# Patient Record
Sex: MALE | Race: BLACK OR AFRICAN AMERICAN | NOT HISPANIC OR LATINO | Employment: FULL TIME | URBAN - METROPOLITAN AREA
[De-identification: names, ages, dates, MRNs, and addresses within clinical notes are randomized per-mention and may not be internally consistent; named-entity substitution may affect disease eponyms.]

---

## 2022-04-20 ENCOUNTER — TRANSFERRED RECORDS (OUTPATIENT)
Dept: HEALTH INFORMATION MANAGEMENT | Facility: CLINIC | Age: 42
End: 2022-04-20

## 2022-04-20 LAB
ALBUMIN (URINE) MG/SPEC: <1.2 MG/DL
ALBUMIN/CREATININE RATIO: 10.1 MG/G CREAT
ALT SERPL-CCNC: 30 U/L
AST SERPL-CCNC: 22 U/L
C TRACH DNA SPEC QL PROBE+SIG AMP: NEGATIVE
CHOLESTEROL (EXTERNAL): 169 MG/DL
CREATININE (EXTERNAL): 1.07 MG/DL (ref 0.67–1.31)
CREATININE (URINE): 118.9 MG/DL
GFR ESTIMATED (EXTERNAL): 85 ML/MIN
GFR ESTIMATED (IF AFRICAN AMERICAN) (EXTERNAL): 99 ML/MIN
GLUCOSE (EXTERNAL): 92 MG/DL (ref 70–99)
HDLC SERPL-MCNC: 44 MG/DL
LDL CHOLESTEROL (EXTERNAL): 106 MG/DL
N GONORRHOEA DNA SPEC QL PROBE+SIG AMP: NEGATIVE
NON HDL CHOLESTEROL (EXTERNAL): 125 MG/DL
POTASSIUM (EXTERNAL): 4.1 MMOL/L (ref 3.5–5.5)
SPECIMEN DESCRIP: NORMAL
SPECIMEN DESCRIPTION: NORMAL
TRIGLYCERIDES (EXTERNAL): 96 MG/DL

## 2022-04-28 ENCOUNTER — TRANSFERRED RECORDS (OUTPATIENT)
Dept: HEALTH INFORMATION MANAGEMENT | Facility: CLINIC | Age: 42
End: 2022-04-28

## 2022-12-08 ENCOUNTER — OFFICE VISIT (OUTPATIENT)
Dept: PEDIATRICS | Facility: CLINIC | Age: 42
End: 2022-12-08
Payer: MEDICAID

## 2022-12-08 VITALS
HEART RATE: 82 BPM | WEIGHT: 286.9 LBS | TEMPERATURE: 98 F | RESPIRATION RATE: 16 BRPM | BODY MASS INDEX: 40.17 KG/M2 | HEIGHT: 71 IN | SYSTOLIC BLOOD PRESSURE: 146 MMHG | DIASTOLIC BLOOD PRESSURE: 86 MMHG | OXYGEN SATURATION: 98 %

## 2022-12-08 DIAGNOSIS — Z76.89 POOR DENTITION REQUIRING REFERRAL TO DENTISTRY: ICD-10-CM

## 2022-12-08 DIAGNOSIS — I10 PRIMARY HYPERTENSION: ICD-10-CM

## 2022-12-08 DIAGNOSIS — R74.8 ELEVATED ALKALINE PHOSPHATASE LEVEL: ICD-10-CM

## 2022-12-08 DIAGNOSIS — J45.20 MILD INTERMITTENT ASTHMA, UNSPECIFIED WHETHER COMPLICATED: ICD-10-CM

## 2022-12-08 DIAGNOSIS — F19.90 SUBSTANCE USE DISORDER: ICD-10-CM

## 2022-12-08 DIAGNOSIS — F33.1 MAJOR DEPRESSIVE DISORDER, RECURRENT EPISODE, MODERATE (H): ICD-10-CM

## 2022-12-08 DIAGNOSIS — G40.909 SEIZURE DISORDER (H): Chronic | ICD-10-CM

## 2022-12-08 DIAGNOSIS — E66.01 MORBID OBESITY (H): ICD-10-CM

## 2022-12-08 DIAGNOSIS — E11.9 TYPE 2 DIABETES MELLITUS WITHOUT COMPLICATION, WITHOUT LONG-TERM CURRENT USE OF INSULIN (H): ICD-10-CM

## 2022-12-08 DIAGNOSIS — L40.9 PSORIASIS: ICD-10-CM

## 2022-12-08 DIAGNOSIS — F39 MOOD DISORDER (H): Primary | ICD-10-CM

## 2022-12-08 DIAGNOSIS — F41.9 ANXIETY: ICD-10-CM

## 2022-12-08 DIAGNOSIS — E55.9 VITAMIN D DEFICIENCY: ICD-10-CM

## 2022-12-08 DIAGNOSIS — Z11.3 ROUTINE SCREENING FOR STI (SEXUALLY TRANSMITTED INFECTION): ICD-10-CM

## 2022-12-08 PROBLEM — J45.909 ASTHMA: Chronic | Status: ACTIVE | Noted: 2022-12-08

## 2022-12-08 LAB
ALBUMIN SERPL BCG-MCNC: 4.1 G/DL (ref 3.5–5.2)
ALP SERPL-CCNC: 233 U/L (ref 40–129)
ALT SERPL W P-5'-P-CCNC: 41 U/L (ref 10–50)
ANION GAP SERPL CALCULATED.3IONS-SCNC: 10 MMOL/L (ref 7–15)
AST SERPL W P-5'-P-CCNC: 29 U/L (ref 10–50)
BILIRUB SERPL-MCNC: 0.3 MG/DL
BUN SERPL-MCNC: 11.9 MG/DL (ref 6–20)
CALCIUM SERPL-MCNC: 9.2 MG/DL (ref 8.6–10)
CHLORIDE SERPL-SCNC: 103 MMOL/L (ref 98–107)
CREAT SERPL-MCNC: 1 MG/DL (ref 0.67–1.17)
DEPRECATED HCO3 PLAS-SCNC: 25 MMOL/L (ref 22–29)
ERYTHROCYTE [DISTWIDTH] IN BLOOD BY AUTOMATED COUNT: 12.4 % (ref 10–15)
GFR SERPL CREATININE-BSD FRML MDRD: >90 ML/MIN/1.73M2
GLUCOSE SERPL-MCNC: 224 MG/DL (ref 70–99)
HBA1C MFR BLD: 6.8 % (ref 0–5.6)
HCT VFR BLD AUTO: 46.6 % (ref 40–53)
HCV AB SERPL QL IA: NONREACTIVE
HGB BLD-MCNC: 15.7 G/DL (ref 13.3–17.7)
HIV 1+2 AB+HIV1 P24 AG SERPL QL IA: NONREACTIVE
MCH RBC QN AUTO: 29.7 PG (ref 26.5–33)
MCHC RBC AUTO-ENTMCNC: 33.7 G/DL (ref 31.5–36.5)
MCV RBC AUTO: 88 FL (ref 78–100)
PLATELET # BLD AUTO: 167 10E3/UL (ref 150–450)
POTASSIUM SERPL-SCNC: 4.1 MMOL/L (ref 3.4–5.3)
PROT SERPL-MCNC: 6.6 G/DL (ref 6.4–8.3)
RBC # BLD AUTO: 5.29 10E6/UL (ref 4.4–5.9)
SODIUM SERPL-SCNC: 138 MMOL/L (ref 136–145)
WBC # BLD AUTO: 7.5 10E3/UL (ref 4–11)

## 2022-12-08 PROCEDURE — 82306 VITAMIN D 25 HYDROXY: CPT | Performed by: INTERNAL MEDICINE

## 2022-12-08 PROCEDURE — 82977 ASSAY OF GGT: CPT | Performed by: INTERNAL MEDICINE

## 2022-12-08 PROCEDURE — 85027 COMPLETE CBC AUTOMATED: CPT | Performed by: INTERNAL MEDICINE

## 2022-12-08 PROCEDURE — 80053 COMPREHEN METABOLIC PANEL: CPT | Performed by: INTERNAL MEDICINE

## 2022-12-08 PROCEDURE — 86803 HEPATITIS C AB TEST: CPT | Performed by: INTERNAL MEDICINE

## 2022-12-08 PROCEDURE — 99204 OFFICE O/P NEW MOD 45 MIN: CPT | Performed by: INTERNAL MEDICINE

## 2022-12-08 PROCEDURE — 87591 N.GONORRHOEAE DNA AMP PROB: CPT | Performed by: INTERNAL MEDICINE

## 2022-12-08 PROCEDURE — 87491 CHLMYD TRACH DNA AMP PROBE: CPT | Performed by: INTERNAL MEDICINE

## 2022-12-08 PROCEDURE — 87389 HIV-1 AG W/HIV-1&-2 AB AG IA: CPT | Performed by: INTERNAL MEDICINE

## 2022-12-08 PROCEDURE — 83036 HEMOGLOBIN GLYCOSYLATED A1C: CPT | Performed by: INTERNAL MEDICINE

## 2022-12-08 PROCEDURE — 82043 UR ALBUMIN QUANTITATIVE: CPT | Performed by: INTERNAL MEDICINE

## 2022-12-08 PROCEDURE — 36415 COLL VENOUS BLD VENIPUNCTURE: CPT | Performed by: INTERNAL MEDICINE

## 2022-12-08 PROCEDURE — 86780 TREPONEMA PALLIDUM: CPT | Performed by: INTERNAL MEDICINE

## 2022-12-08 RX ORDER — BETAMETHASONE DIPROPIONATE 0.5 MG/G
CREAM TOPICAL 2 TIMES DAILY
Qty: 45 G | Refills: 3 | Status: SHIPPED | OUTPATIENT
Start: 2022-12-08

## 2022-12-08 RX ORDER — ALBUTEROL SULFATE 90 UG/1
2 AEROSOL, METERED RESPIRATORY (INHALATION) EVERY 6 HOURS PRN
Qty: 18 G | Refills: 3 | Status: SHIPPED | OUTPATIENT
Start: 2022-12-08 | End: 2023-01-09

## 2022-12-08 RX ORDER — EMOLLIENT BASE
CREAM (GRAM) TOPICAL 2 TIMES DAILY
Qty: 453 G | Refills: 3 | Status: SHIPPED | OUTPATIENT
Start: 2022-12-08 | End: 2023-01-09

## 2022-12-08 ASSESSMENT — PATIENT HEALTH QUESTIONNAIRE - PHQ9
10. IF YOU CHECKED OFF ANY PROBLEMS, HOW DIFFICULT HAVE THESE PROBLEMS MADE IT FOR YOU TO DO YOUR WORK, TAKE CARE OF THINGS AT HOME, OR GET ALONG WITH OTHER PEOPLE: VERY DIFFICULT
SUM OF ALL RESPONSES TO PHQ QUESTIONS 1-9: 22
SUM OF ALL RESPONSES TO PHQ QUESTIONS 1-9: 22

## 2022-12-08 ASSESSMENT — PAIN SCALES - GENERAL: PAINLEVEL: WORST PAIN (10)

## 2022-12-08 NOTE — LETTER
December 14, 2022      Lev Downey  [address] [place] [address]        Bk Ohara,    Your vitamin d is quite low. I would recommend high dose treatment x 8 weeks and then 2000 international unit(s) daily (over the counter). Repeat vitamin d in 2 months.      BARBARA Hernandez MD  Internal Medicine-Pediatrics    Vitamin D, Total (25-Hydroxy) 20 - 75 ug/L 10 Low
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
## 2022-12-08 NOTE — PATIENT INSTRUCTIONS
Great to meet you!    We're working on getting more information about what meds you have at [facility], etc.     I do think meeting with Neurology, Dermatology, and Psychiatry will be important. We will help you connect with them.     In the meantime can start the creams for the psoriasis - the beamethasone only goes on your arms/legs.     Priscilla will be in touch after your labs come back.     We'll work together on restarting meds slowly.

## 2022-12-08 NOTE — LETTER
Bk Ohara,     Thank you for choosing [facility] today for your health care needs.     [facility] is transforming primary care  At [facility], we re dedicated to constantly improve how we serve the health care needs of our patients and communities. We re currently making changes to the way we deliver care.      Changes you ll notice include:    An emphasis on building a relationship with a primary care provider    Access to a PAL (personal advocate and liaison) to help guide you with your care needs    Appointment lengths tailored to your specific needs    Greater access to a broader care team and community resources     Improved online access to your care team    Benefits of a primary care provider  If you don t have a designated primary care provider, we encourage you to get to know our care team online and find a provider you d like to see. Most of our providers have a short video on their online provider page. Visit [email or web address] to explore our providers and locations.    Benefits of having a primary care provider include:      They get to know you - your health history, family history and goals, making it easier to make a health plan together.     You get to know them - making health-related conversations and decisions easier      Primary care doctors help you when you re sick or hurt - but also focus on keeping you healthy with preventive care and screenings.      A doctor who sees you regularly is more likely to notice changes in your health.     To help make sure you get the right care, at the right time, we include PALs, or Patient Advocate Liaisons, as part of your care team. Your PAL will be your first line of contact. They ll advocate for your needs and help you navigate our services, connecting you with care team members and community resources to ensure your care is well coordinated. You ll be introduced to a PAL in an upcoming visit.     Expanded care team access with tailored  appointment lengths  Depending on your health care needs, you may have longer or shorter appointments and see additional care team providers - including Medication Therapy Management (MTM) pharmacists, diabetes educators, behavioral health clinicians, or social workers. At times, they may be included in your visit with your provider, or you may see them individually.     Navigating [facility] resources   We partner with [facility] and [facility] to help patients overcome challenges that can make it hard to get health care, including financial hardship, transportation or mobility concerns.     Online access to your health care records and care team  [employer] is our online tool that makes it easy to see your health care information and communicate with your care team.   [employer] allows you to:     View your health maintenance plan so you know when you re due for a preventive screening    Message your PAL or care team     View your health history and visit summaries     Schedule appointments     Complete questionnaires and eCheck-in before appointments      Get care for minor conditions from your provider with an e-visit     View and pay your bill     Sign up at [employer] [facility] [employer]. Once you have an account, you also can download the mobile shagufta.     Connecting to fast and convenient care  When you need fast, convenient care - consider one of the following options:       Video Visit: A convenient care option for visiting with your provider out of the comfort of your own home. Most of the things you come to the clinic to address with your provider can now be done virtually through a video. This includes your chronic medication follow up, questions or concerns you may have, and even your annual Medicare Wellness Visit.       Phone Visit: Another convenient option for follow up of common problems that may require a more in-depth discussion with your provider.       E-visit: When you need  acute care quickly, or have a quick question about your medication, an E-visit is completed through [employer] and your provider will respond within one business day.      Sincerely,  Priscilla ENRIQUEZ [facility]   [phone]

## 2022-12-08 NOTE — LETTER
December 13, 2022      Lev LIEBERMAN Downey  [address] [place] [address]        Bk Ohara,     A1c  looks okay but I know it covers some time w/ and some w/out meds. Restarted glipizide 2.5 mg. Keep track of bg (check fasting a few times a week and 2 hours after meals a few times a week).     Alk phos - often related to liver or bone was a bit elevated. Will repeat in 2 weeks.     Your blood counts were normal.     Your standard STI screening was negative.     Your standard one-time screening for HIV was negative.     Your standard one-time screening for hepatitis C was negative.     We will most likely start blood pressure medications after we are able to reconcile what we receive from [facility].       Resulted Orders   Comprehensive metabolic panel (BMP + Alb, Alk Phos, ALT, AST, Total. Bili, TP)   Result Value Ref Range    Sodium 138 136 - 145 mmol/L    Potassium 4.1 3.4 - 5.3 mmol/L    Chloride 103 98 - 107 mmol/L    Carbon Dioxide (CO2) 25 22 - 29 mmol/L    Anion Gap 10 7 - 15 mmol/L    Urea Nitrogen 11.9 6.0 - 20.0 mg/dL    Creatinine 1.00 0.67 - 1.17 mg/dL    Calcium 9.2 8.6 - 10.0 mg/dL    Glucose 224 (H) 70 - 99 mg/dL    Alkaline Phosphatase 233 (H) 40 - 129 U/L    AST 29 10 - 50 U/L    ALT 41 10 - 50 U/L    Protein Total 6.6 6.4 - 8.3 g/dL    Albumin 4.1 3.5 - 5.2 g/dL    Bilirubin Total 0.3 <=1.2 mg/dL    GFR Estimate >90 >60 mL/min/1.73m2      Comment:      Effective December 21, 2021 eGFRcr in adults is calculated using the 2021 CKD-EPI creatinine equation which includes age and gender (Arron et al., NEJM, DOI: 10.1056/OWLNaf4856456)   Hemoglobin A1c   Result Value Ref Range    Hemoglobin A1C 6.8 (H) 0.0 - 5.6 %      Comment:      Normal <5.7%   Prediabetes 5.7-6.4%    Diabetes 6.5% or higher     Note: Adopted from ADA consensus guidelines.   CBC with platelets   Result Value Ref Range    WBC Count 7.5 4.0 - 11.0 10e3/uL    RBC Count 5.29 4.40 - 5.90 10e6/uL    Hemoglobin 15.7 13.3 - 17.7  g/dL    Hematocrit 46.6 40.0 - 53.0 %    MCV 88 78 - 100 fL    MCH 29.7 26.5 - 33.0 pg    MCHC 33.7 31.5 - 36.5 g/dL    RDW 12.4 10.0 - 15.0 %    Platelet Count 167 150 - 450 10e3/uL   Albumin Random Urine Quantitative with Creat Ratio   Result Value Ref Range    Albumin Urine mg/L <12.0 mg/L      Comment:      The reference ranges have not been established in urine albumin. The results should be integrated into the clinical context for interpretation.    Albumin Urine mg/g Cr        Comment:      Unable to calculate, urine albumin and/or urine creatinine is outside detectable limits.  Microalbuminuria is defined as an albumin:creatinine ratio of 17 to 299 for males and 25 to 299 for females. A ratio of albumin:creatinine of 300 or higher is indicative of overt proteinuria.  Due to biologic variability, positive results should be confirmed by a second, first-morning random or 24-hour timed urine specimen. If there is discrepancy, a third specimen is recommended. When 2 out of 3 results are in the microalbuminuria range, this is evidence for incipient nephropathy and warrants increased efforts at glucose control, blood pressure control, and institution of therapy with an angiotensin-converting-enzyme (ACE) inhibitor (if the patient can tolerate it).      Creatinine Urine mg/dL 161.0 mg/dL      Comment:      The reference ranges have not been established in urine creatinine. The results should be integrated into the clinical context for interpretation.   Hepatitis C Screen Reflex to HCV RNA Quant and Genotype   Result Value Ref Range    Hepatitis C Antibody Nonreactive Nonreactive    Narrative    Assay performance characteristics have not been established for newborns, infants, and children.   HIV Antigen Antibody Combo   Result Value Ref Range    HIV Antigen Antibody Combo Nonreactive Nonreactive      Comment:      HIV-1 p24 Ag & HIV-1/HIV-2 Ab Not Detected   NEISSERIA GONORRHOEA PCR   Result Value Ref Range     Neisseria gonorrhoeae Negative Negative      Comment:      Negative for N. gonorrhoeae rRNA by transcription mediated amplification. A negative result by transcription mediated amplification does not preclude the presence of C. trachomatis infection because results are dependent on proper and adequate collection, absence of inhibitors and sufficient rRNA to be detected.   CHLAMYDIA TRACHOMATIS PCR   Result Value Ref Range    Chlamydia trachomatis Negative Negative      Comment:      A negative result by transcription mediated amplification does not preclude the presence of C. trachomatis infection because results are dependent on proper and adequate collection, absence of inhibitors and sufficient rRNA to be detected.   Treponema Abs w Reflex to RPR and Titer   Result Value Ref Range    Treponema Antibody Total Nonreactive Nonreactive       If you have any questions or concerns, please call [phone] and speak to Priscilla.       Sincerely,      Randall Hernandez MD

## 2022-12-08 NOTE — NURSING NOTE
SB4 PAL saw patient during visit today, introduced role, and gave Welcome Letter.    Resources discussed: PAL support.     Follow up plan discussed with patient. Will follow up with patient as requested by PCP.     Priscilla Downey JENNIFER  [phone]

## 2022-12-09 PROBLEM — F41.9 ANXIETY: Status: ACTIVE | Noted: 2022-12-09

## 2022-12-09 PROBLEM — F39 MOOD DISORDER (H): Status: ACTIVE | Noted: 2022-12-09

## 2022-12-09 PROBLEM — F19.90 SUBSTANCE USE DISORDER: Status: ACTIVE | Noted: 2022-12-09

## 2022-12-09 PROBLEM — F33.1 MAJOR DEPRESSIVE DISORDER, RECURRENT EPISODE, MODERATE (H): Status: ACTIVE | Noted: 2022-12-09

## 2022-12-09 LAB
C TRACH DNA SPEC QL NAA+PROBE: NEGATIVE
CREAT UR-MCNC: 161 MG/DL
GGT SERPL-CCNC: 43 U/L (ref 8–61)
MICROALBUMIN UR-MCNC: <12 MG/L
MICROALBUMIN/CREAT UR: NORMAL MG/G{CREAT}
N GONORRHOEA DNA SPEC QL NAA+PROBE: NEGATIVE
T PALLIDUM AB SER QL: NONREACTIVE

## 2022-12-09 RX ORDER — GLIPIZIDE 2.5 MG/1
2.5 TABLET, EXTENDED RELEASE ORAL DAILY
Qty: 90 TABLET | Refills: 0 | Status: SHIPPED | OUTPATIENT
Start: 2022-12-09 | End: 2023-01-09

## 2022-12-12 ENCOUNTER — TRANSFERRED RECORDS (OUTPATIENT)
Dept: HEALTH INFORMATION MANAGEMENT | Facility: CLINIC | Age: 42
End: 2022-12-12

## 2022-12-12 LAB — DEPRECATED CALCIDIOL+CALCIFEROL SERPL-MC: 10 UG/L (ref 20–75)

## 2022-12-13 ENCOUNTER — TELEPHONE (OUTPATIENT)
Dept: PEDIATRICS | Facility: CLINIC | Age: 42
End: 2022-12-13

## 2022-12-13 RX ORDER — HYDROXYZINE HYDROCHLORIDE 50 MG/1
50 TABLET, FILM COATED ORAL 3 TIMES DAILY PRN
COMMUNITY
End: 2023-01-06

## 2022-12-13 RX ORDER — TRAZODONE HYDROCHLORIDE 100 MG/1
100 TABLET ORAL AT BEDTIME
COMMUNITY
End: 2023-01-06

## 2022-12-13 RX ORDER — GABAPENTIN 300 MG/1
300 CAPSULE ORAL 3 TIMES DAILY
COMMUNITY
End: 2022-12-29

## 2022-12-13 RX ORDER — IBUPROFEN 200 MG
600 TABLET ORAL 3 TIMES DAILY PRN
COMMUNITY

## 2022-12-13 RX ORDER — ACETAMINOPHEN 500 MG
1000 TABLET ORAL EVERY 4 HOURS PRN
COMMUNITY

## 2022-12-13 RX ORDER — LEVETIRACETAM 1000 MG/1
1000 TABLET ORAL 2 TIMES DAILY
COMMUNITY
End: 2023-01-06

## 2022-12-13 RX ORDER — CLONIDINE HYDROCHLORIDE 0.2 MG/1
0.2 TABLET ORAL 3 TIMES DAILY
COMMUNITY
End: 2023-01-06

## 2022-12-13 RX ORDER — BUPRENORPHINE AND NALOXONE 4; 1 MG/1; MG/1
1 FILM, SOLUBLE BUCCAL; SUBLINGUAL 2 TIMES DAILY
COMMUNITY
End: 2023-03-08

## 2022-12-13 RX ORDER — CITALOPRAM HYDROBROMIDE 40 MG/1
40 TABLET ORAL EVERY MORNING
COMMUNITY
End: 2023-01-06

## 2022-12-13 RX ORDER — DOCUSATE SODIUM 100 MG/1
100 CAPSULE, LIQUID FILLED ORAL 2 TIMES DAILY
COMMUNITY
End: 2023-01-06

## 2022-12-13 RX ORDER — DIPHENHYDRAMINE HCL 25 MG
25 TABLET ORAL
COMMUNITY
End: 2022-12-14

## 2022-12-13 RX ORDER — GINGER ROOT 550 MG
500 CAPSULE ORAL EVERY 4 HOURS PRN
COMMUNITY

## 2022-12-13 RX ORDER — LANOLIN ALCOHOL/MO/W.PET/CERES
3 CREAM (GRAM) TOPICAL
COMMUNITY
End: 2022-12-14

## 2022-12-13 RX ORDER — BUPROPION HYDROCHLORIDE 150 MG/1
150 TABLET ORAL EVERY MORNING
COMMUNITY
End: 2023-01-06

## 2022-12-13 RX ORDER — ASPIRIN 325 MG/1
650 TABLET, FILM COATED ORAL EVERY 4 HOURS PRN
COMMUNITY

## 2022-12-13 NOTE — TELEPHONE ENCOUNTER
Received medication list from Claudette at [facility].     Claudette stated that they dispense all medications to patients.     Reconciled medications.     They do not have the emollient cream listed on their medication list.    Priscilla Downey, JENNIFER  [phone]

## 2022-12-14 DIAGNOSIS — G47.00 INSOMNIA, UNSPECIFIED TYPE: Primary | ICD-10-CM

## 2022-12-14 DIAGNOSIS — F19.90 SUBSTANCE USE DISORDER: ICD-10-CM

## 2022-12-14 DIAGNOSIS — F33.1 MAJOR DEPRESSIVE DISORDER, RECURRENT EPISODE, MODERATE (H): ICD-10-CM

## 2022-12-14 DIAGNOSIS — F39 MOOD DISORDER (H): ICD-10-CM

## 2022-12-14 RX ORDER — DIPHENHYDRAMINE HCL 25 MG
50 TABLET ORAL
Start: 2022-12-14

## 2022-12-14 RX ORDER — LANOLIN ALCOHOL/MO/W.PET/CERES
6 CREAM (GRAM) TOPICAL
Start: 2022-12-14

## 2022-12-14 RX ORDER — ERGOCALCIFEROL 1.25 MG/1
50000 CAPSULE, LIQUID FILLED ORAL WEEKLY
Qty: 8 CAPSULE | Refills: 0 | Status: SHIPPED | OUTPATIENT
Start: 2022-12-14 | End: 2023-01-09

## 2022-12-14 NOTE — CONFIDENTIAL NOTE
Please do blood pressure 3x weekly and call weekly w/ results.     Mid-morning okay - at least 2 hours after AM meds.     Sitting calmly for 10 min, legs uncrossed, arm at heart level. Take 3x and average.     Letter signed.    BARBARA Hernandez MD  Internal Medicine-Pediatrics

## 2022-12-14 NOTE — LETTER
December 14, 2022      Lev Downey  [address] [place] [address]        To whom it may concern,   RE: Lev Ohara is currently a patient under my care. It is my recommendation that he be scheduled with long term therapy and psychiatry. Most are virtual so he will be able to continue care after he is discharged if he decides to move back to [place]. They are also booking out into the new year so it would be prudent to start the process now.     To schedule please call: [phone]       Sincerely,      Randall Hernandez MD

## 2022-12-14 NOTE — CONFIDENTIAL NOTE
Do they check his bp there? Was elevated here in clinic and would consider starting another antihypertensive. Could add more labs and do bp recheck on 12/22/22 if we start something.    28 days? If so please call closer to discharge date to see plan.    Holding off on prescribing meds until we see if he will be staying locally. Can see Beth Villatoro for suboxone if staying locally (put something on calendar now).     In the future -   - Neuro  - MTM (? Clonidine as best med)    Psych referral and therapy referral placed now.     BARBARA Hernandez MD  Internal Medicine-Pediatrics

## 2022-12-14 NOTE — PROGRESS NOTES
Called [facility] and spoke to Claudette.     Stated they need a doctors order to do blood pressures. Will need how often through out the week and the time of day if a specified time is required.     Claudette is unsure of his discharge date. It is usually close to 28 days but depends on the patient. His discharge should be around the end of this month.     Would like to work on long term psych and therapy with counselor. Requested information for scheduling to be faxed and it will be given to his counselor to review with the patient. Pended it in letter format if PCP agrees.     Priscilla Downey JENNIFER  [phone]

## 2022-12-14 NOTE — LETTER
December 14, 2022      Lev Downey  [address] [place] [address]              To nursing staff,  RE: Lev Downey      Please do blood pressure 3x weekly and call weekly w/ results.      Mid-morning okay - at least 2 hours after AM meds.      Sitting calmly for 10 min, legs uncrossed, arm at heart level. Take 3x and average.       Sincerely,      Randall Hernandez MD

## 2022-12-22 ENCOUNTER — LAB (OUTPATIENT)
Dept: LAB | Facility: CLINIC | Age: 42
End: 2022-12-22
Payer: MEDICAID

## 2022-12-22 DIAGNOSIS — R74.8 ELEVATED ALKALINE PHOSPHATASE LEVEL: ICD-10-CM

## 2022-12-22 DIAGNOSIS — L40.9 PSORIASIS: ICD-10-CM

## 2022-12-22 DIAGNOSIS — F41.9 ANXIETY: Primary | ICD-10-CM

## 2022-12-22 LAB
ALBUMIN SERPL BCG-MCNC: 3.7 G/DL (ref 3.5–5.2)
ALP SERPL-CCNC: 201 U/L (ref 40–129)
ALT SERPL W P-5'-P-CCNC: 23 U/L (ref 10–50)
AST SERPL W P-5'-P-CCNC: 20 U/L (ref 10–50)
BILIRUB DIRECT SERPL-MCNC: <0.2 MG/DL (ref 0–0.3)
BILIRUB SERPL-MCNC: 0.2 MG/DL
PROT SERPL-MCNC: 5.8 G/DL (ref 6.4–8.3)
PTH-INTACT SERPL-MCNC: 43 PG/ML (ref 15–65)

## 2022-12-22 PROCEDURE — 36415 COLL VENOUS BLD VENIPUNCTURE: CPT

## 2022-12-22 PROCEDURE — 83970 ASSAY OF PARATHORMONE: CPT

## 2022-12-22 PROCEDURE — 80076 HEPATIC FUNCTION PANEL: CPT

## 2022-12-22 NOTE — TELEPHONE ENCOUNTER
Medication Question or Refill    Contacts       Type Contact Phone/Fax    12/22/2022 12:37 PM CST Phone (Incoming) Lev Downey LUISANA (Self) [phone] (H)     Phone # is for [facility] staff; [phone]          What medication are you calling about (include dose and sig)?:     1) gabapentin (NEURONTIN) 300 MG capsule  Pt asking for rx for 1200mg, 3x/day    2) Ottella(?sp?) for psoriasis    Controlled Substance Agreement on file:   CSA -- Patient Level:    CSA: None found at the patient level.       Who prescribed the medication?: asking Dr Holly Nicholas to refill    Do you need a refill? Yes    When did you use the medication last? current    Patient offered an appointment? No    Do you have any questions or concerns?  Yes - Dr Holly Nicholas had discussed at 12/8/2022 visit; pt will build up gabapentin rx to 1200 mg 3/xday.  Other rx is for psoriasis; not sure if disclosed to Dr Fernandez.    Preferred Pharmacy:     YES - [facility] [phone]  [address]  [address]  [facility] [address]  Phone: [phone] Fax: [phone]      Okay to leave a detailed message?: No at Other phone number:  Call [facility] at [phone] with questions; staff will answer.

## 2022-12-26 NOTE — TELEPHONE ENCOUNTER
Routing refill request to provider for review/approval because:  Drug not on the [facility] refill protocol   Psoriasis Rx's - recently sent to pharm at beginning of month, no refills needed.     Otezla vs Otella? Last visit recommended reconnecting with Derm but no referral placed

## 2022-12-26 NOTE — TELEPHONE ENCOUNTER
Called Renee from patient's treatment facility:      Currently prescribed 300 MG 3x a day- Darryl is prescribing it.     Reaching out to you to see if PCP would be willing to up it, not controlling symptoms.    Discharge date still not decided.     Renee requested a call back because she is not sure what the plan fr the patient it and Claudette will be back tomorrow. Postponing encounter for additional outreach.     Priscilla Downey JENNIFER  [phone]

## 2022-12-29 RX ORDER — GABAPENTIN 300 MG/1
300 CAPSULE ORAL 3 TIMES DAILY
Qty: 90 CAPSULE | Refills: 1 | Status: SHIPPED | OUTPATIENT
Start: 2022-12-29 | End: 2023-01-09

## 2022-12-29 NOTE — TELEPHONE ENCOUNTER
Refilled the gabapentin. Are there other meds that I need to take over?    Derm referral placed. I would also have them try [facility] to see if he can be seen sooner. If he can be flexible on location may get him seen sooner.    BARBARA Hernandez MD  Internal Medicine-Pediatrics

## 2022-12-30 NOTE — TELEPHONE ENCOUNTER
Called nursing team and scheduled appointment. They will call me if patient leaves treatment facility before appointment date. Discharge date has still not been decided.     Priscilla Downey, JENNIFER  [phone]

## 2023-01-06 DIAGNOSIS — F41.9 ANXIETY: ICD-10-CM

## 2023-01-06 DIAGNOSIS — E11.9 TYPE 2 DIABETES MELLITUS WITHOUT COMPLICATION, WITHOUT LONG-TERM CURRENT USE OF INSULIN (H): ICD-10-CM

## 2023-01-06 DIAGNOSIS — L40.9 PSORIASIS: ICD-10-CM

## 2023-01-06 DIAGNOSIS — R56.9 PARTIAL SEIZURES (H): Primary | ICD-10-CM

## 2023-01-06 DIAGNOSIS — E55.9 VITAMIN D DEFICIENCY: ICD-10-CM

## 2023-01-06 DIAGNOSIS — J45.20 MILD INTERMITTENT ASTHMA, UNSPECIFIED WHETHER COMPLICATED: ICD-10-CM

## 2023-01-06 DIAGNOSIS — K59.00 CONSTIPATION, UNSPECIFIED CONSTIPATION TYPE: ICD-10-CM

## 2023-01-06 DIAGNOSIS — G47.00 INSOMNIA, UNSPECIFIED TYPE: ICD-10-CM

## 2023-01-06 NOTE — TELEPHONE ENCOUNTER
Patient called and left a voicemail on my direct line. Patient stated he needs a refill of all of his medications. He also mentioned he will be leaving [facility], did not specify a date. Gave new pharmacy, pended.     Attempted to call patient back to get more information on his timeline, no answer. Unable to leave a message voicemail is not set-up.     Priscilla Downey, CHG  [phone]

## 2023-01-09 RX ORDER — CLONIDINE HYDROCHLORIDE 0.2 MG/1
0.2 TABLET ORAL 3 TIMES DAILY
Qty: 270 TABLET | Refills: 0 | Status: SHIPPED | OUTPATIENT
Start: 2023-01-09 | End: 2023-04-24

## 2023-01-09 RX ORDER — GLIPIZIDE 2.5 MG/1
2.5 TABLET, EXTENDED RELEASE ORAL DAILY
Qty: 90 TABLET | Refills: 0 | Status: SHIPPED | OUTPATIENT
Start: 2023-01-09 | End: 2023-04-24

## 2023-01-09 RX ORDER — LEVETIRACETAM 1000 MG/1
1000 TABLET ORAL 2 TIMES DAILY
Qty: 180 TABLET | Refills: 0 | Status: SHIPPED | OUTPATIENT
Start: 2023-01-09

## 2023-01-09 RX ORDER — EMOLLIENT BASE
CREAM (GRAM) TOPICAL 2 TIMES DAILY
Qty: 453 G | Refills: 3 | Status: SHIPPED | OUTPATIENT
Start: 2023-01-09

## 2023-01-09 RX ORDER — CITALOPRAM HYDROBROMIDE 40 MG/1
40 TABLET ORAL EVERY MORNING
Qty: 90 TABLET | Refills: 0 | Status: SHIPPED | OUTPATIENT
Start: 2023-01-09 | End: 2023-04-24

## 2023-01-09 RX ORDER — DOCUSATE SODIUM 100 MG/1
100 CAPSULE, LIQUID FILLED ORAL 2 TIMES DAILY
Qty: 180 CAPSULE | Refills: 0 | Status: SHIPPED | OUTPATIENT
Start: 2023-01-09

## 2023-01-09 RX ORDER — GABAPENTIN 300 MG/1
300 CAPSULE ORAL 3 TIMES DAILY
Qty: 270 CAPSULE | Refills: 0 | Status: SHIPPED | OUTPATIENT
Start: 2023-01-09 | End: 2023-03-08

## 2023-01-09 RX ORDER — BUPROPION HYDROCHLORIDE 150 MG/1
150 TABLET ORAL EVERY MORNING
Qty: 90 TABLET | Refills: 0 | Status: SHIPPED | OUTPATIENT
Start: 2023-01-09

## 2023-01-09 RX ORDER — ERGOCALCIFEROL 1.25 MG/1
50000 CAPSULE, LIQUID FILLED ORAL WEEKLY
Qty: 8 CAPSULE | Refills: 0 | Status: SHIPPED | OUTPATIENT
Start: 2023-01-09 | End: 2023-03-22

## 2023-01-09 RX ORDER — HYDROXYZINE HYDROCHLORIDE 50 MG/1
50 TABLET, FILM COATED ORAL 3 TIMES DAILY PRN
Qty: 270 TABLET | Refills: 0 | Status: SHIPPED | OUTPATIENT
Start: 2023-01-09

## 2023-01-09 RX ORDER — TRAZODONE HYDROCHLORIDE 100 MG/1
100 TABLET ORAL AT BEDTIME
Qty: 90 TABLET | Refills: 0 | Status: SHIPPED | OUTPATIENT
Start: 2023-01-09 | End: 2023-04-24

## 2023-01-09 RX ORDER — ALBUTEROL SULFATE 90 UG/1
2 AEROSOL, METERED RESPIRATORY (INHALATION) EVERY 6 HOURS PRN
Qty: 18 G | Refills: 3 | Status: SHIPPED | OUTPATIENT
Start: 2023-01-09

## 2023-01-10 NOTE — TELEPHONE ENCOUNTER
Refilled for #90 days so he can establish with someone in [place].    Will need Derm, Neuro.     Typically would not prescribe wellbutrin in someone w/ history of seizure disorder but will not make a change at this time.     Will need to be bridged with suboxone from another provider.     BARBARA Hernandez MD  Internal Medicine-Pediatrics

## 2023-01-16 ENCOUNTER — TRANSFERRED RECORDS (OUTPATIENT)
Dept: HEALTH INFORMATION MANAGEMENT | Facility: CLINIC | Age: 43
End: 2023-01-16

## 2023-01-17 NOTE — TELEPHONE ENCOUNTER
Called patient, informed of refills. Patient understands that he only has 90 days.     Patient will establish with a new provider in [place] and look for neuro/derm.     Patient's insurance will cover rides within 30 miles of home.     Patient confirmed appointment tomorrow for a phone visit.     Priscilla Downey, JENNIFER  [phone]

## 2023-01-19 ENCOUNTER — TELEPHONE (OUTPATIENT)
Dept: PEDIATRICS | Facility: CLINIC | Age: 43
End: 2023-01-19

## 2023-01-19 ENCOUNTER — VIRTUAL VISIT (OUTPATIENT)
Dept: PEDIATRICS | Facility: CLINIC | Age: 43
End: 2023-01-19
Payer: COMMERCIAL

## 2023-01-19 DIAGNOSIS — F33.1 MAJOR DEPRESSIVE DISORDER, RECURRENT EPISODE, MODERATE (H): ICD-10-CM

## 2023-01-19 DIAGNOSIS — E11.9 TYPE 2 DIABETES MELLITUS WITHOUT COMPLICATION, WITHOUT LONG-TERM CURRENT USE OF INSULIN (H): Chronic | ICD-10-CM

## 2023-01-19 DIAGNOSIS — F19.90 SUBSTANCE USE DISORDER: Primary | ICD-10-CM

## 2023-01-19 DIAGNOSIS — F39 MOOD DISORDER (H): ICD-10-CM

## 2023-01-19 DIAGNOSIS — R56.9 PARTIAL SEIZURES (H): ICD-10-CM

## 2023-01-19 DIAGNOSIS — F41.9 ANXIETY: ICD-10-CM

## 2023-01-19 DIAGNOSIS — E66.01 MORBID OBESITY (H): ICD-10-CM

## 2023-01-19 DIAGNOSIS — F19.90 SUBSTANCE USE DISORDER: ICD-10-CM

## 2023-01-19 DIAGNOSIS — I10 PRIMARY HYPERTENSION: ICD-10-CM

## 2023-01-19 DIAGNOSIS — L40.9 PSORIASIS: Chronic | ICD-10-CM

## 2023-01-19 PROCEDURE — 99214 OFFICE O/P EST MOD 30 MIN: CPT | Mod: 95 | Performed by: INTERNAL MEDICINE

## 2023-01-19 RX ORDER — BUPRENORPHINE AND NALOXONE 4; 1 MG/1; MG/1
1 FILM, SOLUBLE BUCCAL; SUBLINGUAL DAILY
Qty: 4 FILM | Refills: 0 | Status: SHIPPED | OUTPATIENT
Start: 2023-01-19 | End: 2023-01-20

## 2023-01-19 RX ORDER — BUPRENORPHINE AND NALOXONE 4; 1 MG/1; MG/1
1 FILM, SOLUBLE BUCCAL; SUBLINGUAL DAILY
Qty: 4 FILM | Refills: 0 | Status: SHIPPED | OUTPATIENT
Start: 2023-01-19 | End: 2023-01-19

## 2023-01-19 NOTE — PROGRESS NOTES
"Lev is a 42 year old who is being evaluated via a billable telephone visit.        Distant Location (provider location):  On-site    Assessment & Plan       ICD-10-CM    1. Substance use disorder on suboxone  F19.90       2. Mood disorder (H)  F39       3. Major depressive disorder, recurrent episode, moderate (H)  F33.1       4. BMI > 40.0 (H)  E66.01       5. Anxiety  F41.9       6. Partial seizures (H)  R56.9       7. Type 2 diabetes mellitus without complication, without long-term current use of insulin (H)  E11.9       8. Psoriasis  L40.9       9. Primary hypertension  I10         Meds recently sent.     Dann w/ colleague and she will bridge him through today/tomorrow and then do VV tomorrow to talked about bridging.    Dann w/ PAL and she will ensure [facility] in [place] has filled his meds.     Gave pt # for [facility] and PAL.      30 minutes spent on the date of the encounter doing chart review, history and exam, documentation and further activities per the note       BMI:   Estimated body mass index is 40.58 kg/m  as calculated from the following:    Height as of 12/8/22: 1.791 m (5' 10.5\").    Weight as of 12/8/22: 130.1 kg (286 lb 14.4 oz).           No follow-ups on file.    Randall Hernandez MD  [facility] DEANDRA Ohara is a 42 year old, presenting for the following health issues:  Recheck Medication      HPI     Left [facility] last Monday.    Moved up to [place] - has family and previously lived in the area.     Only gave him 3 day of suboxone - tried to stretch it out - took it every other day.   - [facility] had him at two 4 tabs twice a daily - before he got incarcerated he was at 8 mg tabs twice a day.     [facility] can do suboxone he thinks. Also re-enrolled in aftercare at [facility]. They do mental health and thinks they have medical management. Will have more information tomorrow.    Didn't realize that I sent 3 months of his other medications " to [facility] in [place] to bridge him.     Review of Systems   Constitutional, HEENT, cardiovascular, pulmonary, gi and gu systems are negative, except as otherwise noted.      Objective           Vitals:  No vitals were obtained today due to virtual visit.    Physical Exam   healthy, alert and no distress  PSYCH: Alert and oriented times 3; coherent speech, normal   rate and volume, able to articulate logical thoughts, able   to abstract reason, no tangential thoughts, no hallucinations   or delusions  His affect is normal  RESP: No cough, no audible wheezing, able to talk in full sentences  Remainder of exam unable to be completed due to telephone visits            Phone call duration: 14 minutes

## 2023-01-19 NOTE — TELEPHONE ENCOUNTER
PRIOR AUTHORIZATION DENIED    Medication: buprenorphine HCl-naloxone HCl (SUBOXONE) 4-1 MG per film - EPA DENIED    Denial Date: 1/19/2023    Denial Rational:           Appeal Information:

## 2023-01-20 ENCOUNTER — VIRTUAL VISIT (OUTPATIENT)
Dept: PEDIATRICS | Facility: CLINIC | Age: 43
End: 2023-01-20
Payer: COMMERCIAL

## 2023-01-20 DIAGNOSIS — F11.21 OPIOID USE DISORDER, MODERATE, IN EARLY REMISSION (H): Primary | ICD-10-CM

## 2023-01-20 DIAGNOSIS — F19.90 SUBSTANCE USE DISORDER: ICD-10-CM

## 2023-01-20 DIAGNOSIS — F33.1 MAJOR DEPRESSIVE DISORDER, RECURRENT EPISODE, MODERATE (H): ICD-10-CM

## 2023-01-20 PROCEDURE — 99214 OFFICE O/P EST MOD 30 MIN: CPT | Mod: 95 | Performed by: NURSE PRACTITIONER

## 2023-01-20 RX ORDER — BUPRENORPHINE AND NALOXONE 4; 1 MG/1; MG/1
1 FILM, SOLUBLE BUCCAL; SUBLINGUAL 2 TIMES DAILY
Qty: 60 FILM | Refills: 0 | Status: SHIPPED | OUTPATIENT
Start: 2023-01-20 | End: 2023-02-08

## 2023-01-20 NOTE — Clinical Note
"Harrison:   -Just met with [place] and he seems to be a poor historian re: medication and his OUD  -He is doing IOP at [facility] in [place].  His therapist is [place].   -He drops urine x2 weekly.  We need [place] to fax those UDS results to us.  Can you contact [place] and give her our station's fax number? (Pt will need to sign release there) -Do you mind giving her your contact info if she needs to get in touch with us? -I will continue prescribing his Suboxone until he finds a provider -Can you f/u with him in 2 w to see how he is doing--withdrawals etc? -I will do a VV with him in 4 weeks.  Use the Suboxone block that I have built in my schedule.  30m is fine.  Jesus: -Has questions about current gabapentin dose and medication for his psoriasis.  I'm guessing his neuropathy/pain will get better once he is stable on his Sub.  He is on a very low dose of Sub so there is wiggle room to increase.  We could also increase his sree to his \"usual\" dose of 1200mg daily.  Let me know your thoughts."

## 2023-01-20 NOTE — PROGRESS NOTES
Lev is a 42 year old who is being evaluated via a billable telephone visit.      What phone number would you like to be contacted at? [phone]  How would you like to obtain your AVS? [place]art    Distant Location (provider location):  On-site    Assessment & Plan     Opioid use disorder, moderate, in early remission (H)  Substance use disorder  Currently in early remission.  IOP at [facility] in [place], [place].    Therapist [place].  Messaged PAL to have patient sign release to have UDS faxed to us.  PAL to follow-up in 2 weeks for check-in regarding withdrawal symptoms and current dose.  Follow-up in 4 weeks Suboxone refill.  - buprenorphine HCl-naloxone HCl (SUBOXONE) 4-1 MG per film; Place 1 Film under the tongue 2 times daily    Major depressive disorder, recurrent episode, moderate (H)  Stable.  Followed by PCP         Return in about 4 weeks (around 2/17/2023) for Follow up.    Beth Villatoro NP  [facility] DEANDRA Blevins   Lev is a 42 year old presenting for the following health issues:    Medication Request      HPI       OUD:  -Started with alcohol use and marijuana socially at a young age  -Was abusing alcohol and marijuana until he had kids then stopped.  -Methamphetamine use intermittently between [age] years of age.  -Started back with alcohol and marijuana along with oxycodone and fentanyl to control pain.    -Treatment at [facility] in [place] starting November 28th.  Was scheduled for 30 day tx but relapsed with   -Relapsed in treatment with oxycodone--states someone had it in the center  -Out of treatment 1/5/2023  -Suboxone 4mg BID #30 prescribed 12/24/2022.  Has been stretching out prescription.   -Relapsed 1/10/2023: oxycodone, meth, fentanyl  -Recently started IOP at [facility] x5 week.  [place], [place].  Therapist in [place].  Provides UDS x2 weekly  -Lives with family who is supportive.  -Current withdrawal symptoms--nausea, weakness, cravings.  -Incarcerated on and  off since 2005 and that has made it difficult with medication compliance  -Looking into a medical marijuana card      Review of Systems   Constitutional, HEENT, cardiovascular, pulmonary, gi and gu systems are negative, except as otherwise noted.      Objective       Vitals:  No vitals were obtained today due to virtual visit.    Physical Exam   healthy, alert and no distress  PSYCH: Alert and oriented times 3; coherent speech, normal   rate and volume, able to articulate logical thoughts, able   to abstract reason, no tangential thoughts, no hallucinations   or delusions  His affect is normal  RESP: No cough, no audible wheezing, able to talk in full sentences  Remainder of exam unable to be completed due to telephone visits          Phone call duration: 33 minutes

## 2023-01-20 NOTE — TELEPHONE ENCOUNTER
Patient is able to get prescription with name brand. Has not picked them up yet.     Priscilla Downey, [facility]  [phone]

## 2023-01-20 NOTE — PATIENT INSTRUCTIONS
It was nice seeing you today.    Please let me know if you have any questions regarding today's visit!    Take care,    WANDA Villatoro DNP  Family Medicine

## 2023-01-23 ENCOUNTER — TELEPHONE (OUTPATIENT)
Dept: PEDIATRICS | Facility: CLINIC | Age: 43
End: 2023-01-23
Payer: COMMERCIAL

## 2023-01-23 ENCOUNTER — TRANSFERRED RECORDS (OUTPATIENT)
Dept: HEALTH INFORMATION MANAGEMENT | Facility: CLINIC | Age: 43
End: 2023-01-23

## 2023-01-23 NOTE — TELEPHONE ENCOUNTER
Huddled with Beth Villatoro.     Now that pt is living in [place] needs to have PCP, MAT/OUD provider, psychiatry, dermatology, and neurology.     Encourage him to sign ERWIN for current therapist so we can help coordinate.    In terms of meds, etc likely to keep things the way they are and encourage him to find a local provider ASAMARY ANNE.     BARBARA Hernandez MD  Internal Medicine-Pediatrics

## 2023-01-30 NOTE — TELEPHONE ENCOUNTER
Called patient's cell, it went straight to a busy tone.     Called patient's mother, no answer. [facility] requesting a call back directly to [facility] extension.     Priscilla Downey, [facility]  [phone]

## 2023-01-31 NOTE — TELEPHONE ENCOUNTER
Called patient to discuss new providers. No answer and patient does not have a voicemail set-up.     Priscilla Downey, CHG  [phone]

## 2023-02-05 ENCOUNTER — TRANSFERRED RECORDS (OUTPATIENT)
Dept: HEALTH INFORMATION MANAGEMENT | Facility: CLINIC | Age: 43
End: 2023-02-05

## 2023-02-06 ENCOUNTER — TRANSFERRED RECORDS (OUTPATIENT)
Dept: HEALTH INFORMATION MANAGEMENT | Facility: CLINIC | Age: 43
End: 2023-02-06

## 2023-02-07 NOTE — TELEPHONE ENCOUNTER
Called patient, no answer, unable to leave a voicemail, not set up.     Priscilla Downey, [facility]  [phone]

## 2023-02-08 ENCOUNTER — VIRTUAL VISIT (OUTPATIENT)
Dept: PEDIATRICS | Facility: CLINIC | Age: 43
End: 2023-02-08
Payer: COMMERCIAL

## 2023-02-08 DIAGNOSIS — F19.90 SUBSTANCE USE DISORDER: ICD-10-CM

## 2023-02-08 DIAGNOSIS — U07.1 INFECTION DUE TO 2019 NOVEL CORONAVIRUS: ICD-10-CM

## 2023-02-08 DIAGNOSIS — F11.21 OPIOID USE DISORDER, MODERATE, IN EARLY REMISSION (H): Primary | ICD-10-CM

## 2023-02-08 PROCEDURE — 99213 OFFICE O/P EST LOW 20 MIN: CPT | Mod: CS | Performed by: NURSE PRACTITIONER

## 2023-02-08 RX ORDER — BUPRENORPHINE AND NALOXONE 4; 1 MG/1; MG/1
1 FILM, SOLUBLE BUCCAL; SUBLINGUAL 2 TIMES DAILY
Qty: 60 FILM | Refills: 0 | Status: SHIPPED | OUTPATIENT
Start: 2023-02-08 | End: 2023-03-08

## 2023-02-08 NOTE — PROGRESS NOTES
Lev is a 42 year old who is being evaluated via a billable telephone visit.      What phone number would you like to be contacted at? [phone]  How would you like to obtain your AVS? Cornelius    Distant Location (provider location):  On-site    Assessment & Plan     Opioid use disorder, moderate, in early remission (H)  Substance use disorder  Stable.  Continue with Suboxone 4mg BID.  Continue working program through [facility] in [place], [place].  Follow-up in one month or sooner if needed.  - buprenorphine HCl-naloxone HCl (SUBOXONE) 4-1 MG per film  Dispense: 60 Film; Refill: 0    Infection due to 2019 novel coronavirus  Tested positive 7-10 days ago.  Symptoms improving.       Counseled the patient on the importance of having a recovery program in addition to medication to manage recovery. Components include avoiding isolating, having willingness to change, avoiding triggers and managing cravings. Encouraged having some type of sober network and practicing honesty with trusted support person(s). Encouraged other services such as counseling, 12 step or other self-help organizations.       Opioid warning reviewed.  Risk of overdose following a period of abstinence due to decrease tolerance was discussed including risk of death.  Strongly recommended abstain from alcohol, benzodiazepines, THC, opioids and other drugs of abuse.  Increased risk of return to opioid use after use of these substances discussed.  Increased risk of overdose/death with use of other substances particularly benzodiazepines/alcohol reviewed      Return in about 4 weeks (around 3/8/2023) for Follow up: Suboxone.    Beth Villatoro NP  [facility] DEANDRA Ohara is a 42 year old presenting for the following health issues:    No chief complaint on file.    HPI     Patient here for follow-up on OUD.    Suboxone dose: currently taking Suboxone 4mg BID.  Last visit was 1/20/2023  Cravings or withdrawals? None.  He  is feeling achy but unsure if this is from withdrawal symptoms or COVID  Last use? 1/10/2023  Currently doing IOP at [facility] in [place], [place], x5 weekly from 6-9pm.  Housing:  Living at home with family.    COVID infection. Dx one week ago.  Weakness.  Body aches.  Symptoms improving.  Has not been attending meetings since being diagnosed with COVID    Last UDS submitted at [facility] 1/26/2023 was negative.    Review of Systems   Constitutional, HEENT, cardiovascular, pulmonary, gi and gu systems are negative, except as otherwise noted.      Objective           Vitals:  No vitals were obtained today due to virtual visit.    Physical Exam   healthy, alert and no distress  PSYCH: Alert and oriented times 3; coherent speech, normal   rate and volume, able to articulate logical thoughts, able   to abstract reason, no tangential thoughts, no hallucinations   or delusions  His affect is normal  RESP: No cough, no audible wheezing, able to talk in full sentences  Remainder of exam unable to be completed due to telephone visits            Phone call duration: 6 minutes

## 2023-02-17 ENCOUNTER — NURSE TRIAGE (OUTPATIENT)
Dept: PEDIATRICS | Facility: CLINIC | Age: 43
End: 2023-02-17
Payer: COMMERCIAL

## 2023-02-17 NOTE — TELEPHONE ENCOUNTER
"Pt called in reporting he had covid 2 weeks ago. Tested positive 2/3/23 then negative on 2/6/23. Symptoms started 1/31 had loss of taste and sense of smell but came back within a week. Pt reports doing better but has chest pain 10/10 located about 6 inches to the right of his heart by his sternum. Unable to take deep breath without pain. Pain came on about 20 minutes ago after her was biking, pt reports had to stop biking and pain slowly went away. Hard to breathe, has inhalor for asthma and just used it not long ago with mild relief.    Huddled with POD and recommends ER for further evaluation.    Patient was given an opportunity to ask questions, verbalized understanding of plan, and is agreeable.    Michelle Riley RN on 2/17/2023 at 3:59 PM          Reason for Disposition    SEVERE chest pain    Answer Assessment - Initial Assessment Questions  1. LOCATION: \"Where does it hurt?\"        6 inches to the right of chest, mid chest. Slight tightness, unable to do deep breath  2. RADIATION: \"Does the pain go anywhere else?\" (e.g., into neck, jaw, arms, back)      No radiation  3. ONSET: \"When did the chest pain begin?\" (Minutes, hours or days)       Chronic chest pain but got significantly worse today  4. PATTERN \"Does the pain come and go, or has it been constant since it started?\"  \"Does it get worse with exertion?\"       Pain is constant and worse with exertion  5. DURATION: \"How long does it last\" (e.g., seconds, minutes, hours)      n/a  6. SEVERITY: \"How bad is the pain?\"  (e.g., Scale 1-10; mild, moderate, or severe)     - MILD (1-3): doesn't interfere with normal activities      - MODERATE (4-7): interferes with normal activities or awakens from sleep     - SEVERE (8-10): excruciating pain, unable to do any normal activities        10  7. CARDIAC RISK FACTORS: \"Do you have any history of heart problems or risk factors for heart disease?\" (e.g., angina, prior heart attack; diabetes, high blood pressure, " "high cholesterol, smoker, or strong family history of heart disease)      Smoker, high blood pressure  8. PULMONARY RISK FACTORS: \"Do you have any history of lung disease?\"  (e.g., blood clots in lung, asthma, emphysema, birth control pills)      asthma  9. CAUSE: \"What do you think is causing the chest pain?\"      no  10. OTHER SYMPTOMS: \"Do you have any other symptoms?\" (e.g., dizziness, nausea, vomiting, sweating, fever, difficulty breathing, cough)        Vomiting, sweat, nausea, difficulty breathing, cough  11. PREGNANCY: \"Is there any chance you are pregnant?\" \"When was your last menstrual period?\"        no    Protocols used: CHEST PAIN-A-OH      "

## 2023-03-06 ENCOUNTER — TELEPHONE (OUTPATIENT)
Dept: PEDIATRICS | Facility: CLINIC | Age: 43
End: 2023-03-06
Payer: COMMERCIAL

## 2023-03-06 NOTE — TELEPHONE ENCOUNTER
Called patient to confirm upcoming appointment. Patient stated he was going back to [facility] and requested an in-person appointment.     Patient has been having intermittent chest congestion. Changed appointment to in-person with longer time if needed.     Priscilla Downey, [facility]  [phone]

## 2023-03-08 ENCOUNTER — OFFICE VISIT (OUTPATIENT)
Dept: PEDIATRICS | Facility: CLINIC | Age: 43
End: 2023-03-08
Payer: COMMERCIAL

## 2023-03-08 VITALS
WEIGHT: 295.3 LBS | RESPIRATION RATE: 14 BRPM | HEIGHT: 71 IN | BODY MASS INDEX: 41.34 KG/M2 | OXYGEN SATURATION: 98 % | HEART RATE: 72 BPM | TEMPERATURE: 98 F | DIASTOLIC BLOOD PRESSURE: 66 MMHG | SYSTOLIC BLOOD PRESSURE: 130 MMHG

## 2023-03-08 DIAGNOSIS — L40.9 PSORIASIS: ICD-10-CM

## 2023-03-08 DIAGNOSIS — G89.4 CHRONIC PAIN SYNDROME: ICD-10-CM

## 2023-03-08 DIAGNOSIS — G62.9 NEUROPATHY: ICD-10-CM

## 2023-03-08 DIAGNOSIS — F11.21 OPIOID USE DISORDER, MODERATE, IN EARLY REMISSION (H): Primary | ICD-10-CM

## 2023-03-08 DIAGNOSIS — B35.3 TINEA PEDIS OF BOTH FEET: ICD-10-CM

## 2023-03-08 DIAGNOSIS — Z86.16 PERSONAL HISTORY OF COVID-19: ICD-10-CM

## 2023-03-08 LAB
AMPHETAMINES UR QL: DETECTED
BARBITURATES UR QL SCN: NOT DETECTED
BENZODIAZ UR QL SCN: NOT DETECTED
BUPRENORPHINE UR QL: DETECTED
CANNABINOIDS UR QL: DETECTED
COCAINE UR QL SCN: NOT DETECTED
D-METHAMPHET UR QL: DETECTED
METHADONE UR QL SCN: NOT DETECTED
OPIATES UR QL SCN: NOT DETECTED
OXYCODONE UR QL SCN: NOT DETECTED
PCP UR QL SCN: NOT DETECTED
PROPOXYPH UR QL: NOT DETECTED
TRICYCLICS UR QL SCN: NOT DETECTED

## 2023-03-08 PROCEDURE — 99214 OFFICE O/P EST MOD 30 MIN: CPT | Performed by: NURSE PRACTITIONER

## 2023-03-08 PROCEDURE — 80306 DRUG TEST PRSMV INSTRMNT: CPT | Performed by: NURSE PRACTITIONER

## 2023-03-08 RX ORDER — GABAPENTIN 600 MG/1
600 TABLET ORAL 3 TIMES DAILY
Qty: 90 TABLET | Refills: 0 | Status: SHIPPED | OUTPATIENT
Start: 2023-03-08 | End: 2023-03-22

## 2023-03-08 RX ORDER — CLOTRIMAZOLE AND BETAMETHASONE DIPROPIONATE 10; .64 MG/G; MG/G
CREAM TOPICAL 2 TIMES DAILY
Qty: 45 G | Refills: 1 | Status: SHIPPED | OUTPATIENT
Start: 2023-03-08

## 2023-03-08 RX ORDER — BUPRENORPHINE HYDROCHLORIDE AND NALOXONE HYDROCHLORIDE DIHYDRATE 8; 2 MG/1; MG/1
TABLET SUBLINGUAL
Qty: 45 TABLET | Refills: 0 | Status: SHIPPED | OUTPATIENT
Start: 2023-03-08 | End: 2023-03-22 | Stop reason: DRUGHIGH

## 2023-03-08 ASSESSMENT — ASTHMA QUESTIONNAIRES
ACT_TOTALSCORE: 17
QUESTION_1 LAST FOUR WEEKS HOW MUCH OF THE TIME DID YOUR ASTHMA KEEP YOU FROM GETTING AS MUCH DONE AT WORK, SCHOOL OR AT HOME: A LITTLE OF THE TIME
QUESTION_5 LAST FOUR WEEKS HOW WOULD YOU RATE YOUR ASTHMA CONTROL: SOMEWHAT CONTROLLED
QUESTION_4 LAST FOUR WEEKS HOW OFTEN HAVE YOU USED YOUR RESCUE INHALER OR NEBULIZER MEDICATION (SUCH AS ALBUTEROL): ONE OR TWO TIMES PER DAY
ACT_TOTALSCORE: 17
QUESTION_2 LAST FOUR WEEKS HOW OFTEN HAVE YOU HAD SHORTNESS OF BREATH: ONCE OR TWICE A WEEK
QUESTION_3 LAST FOUR WEEKS HOW OFTEN DID YOUR ASTHMA SYMPTOMS (WHEEZING, COUGHING, SHORTNESS OF BREATH, CHEST TIGHTNESS OR PAIN) WAKE YOU UP AT NIGHT OR EARLIER THAN USUAL IN THE MORNING: ONCE OR TWICE

## 2023-03-08 ASSESSMENT — PATIENT HEALTH QUESTIONNAIRE - PHQ9
SUM OF ALL RESPONSES TO PHQ QUESTIONS 1-9: 15
SUM OF ALL RESPONSES TO PHQ QUESTIONS 1-9: 15
10. IF YOU CHECKED OFF ANY PROBLEMS, HOW DIFFICULT HAVE THESE PROBLEMS MADE IT FOR YOU TO DO YOUR WORK, TAKE CARE OF THINGS AT HOME, OR GET ALONG WITH OTHER PEOPLE: VERY DIFFICULT

## 2023-03-08 ASSESSMENT — PAIN SCALES - GENERAL: PAINLEVEL: MODERATE PAIN (5)

## 2023-03-08 NOTE — PROGRESS NOTES
Assessment & Plan     Opioid use disorder, moderate, in early remission (H)  Relapsed 3/5/2023.  Experiencing cravings.   Increased Suboxone to 8mg in the morning and 4mg in the evening.  Follow-up in 2 weeks for VV and 4 weeks for OV.  - Drug Abuse Screen Panel 13, Urine (Pain Care Package) - lab collect  - buprenorphine-naloxone (SUBOXONE) 8-2 MG SUBL sublingual tablet; Take 8mg in the morning and 4mg at night.    Tinea pedis of both feet  Worsening.  Referral given along with cream.  - Orthopedic  Referral; Future  - clotrimazole-betamethasone (LOTRISONE) 1-0.05 % external cream; Apply topically 2 times daily    Psoriasis  Referral to dermatology placed.  - Adult Dermatology Referral; Future    Neuropathy  Increased gabapentin to 600mg TID.  Follow-up in 2 weeks.    Personal history of COVID-19  ERWIN signed from ER in [place].    Chronic pain syndrome  Increased Suboxone today  Increased gabapentin today  Follow-up in 2 weeks.      Return in about 4 weeks (around 4/5/2023) for with me.   2 weeks for VV  4 weeks for OV.    Beth Villatoro NP  [facility] DEANDRA Ohara is a 42 year old, presenting for the following health issues:    Recheck Medication      HPI     Suboxone:  -Currently taking Suboxone 4mg BID.  Interested in increasing dose to help with cravings.  -Last visit was 2/8/2023  -Per patient, he refilled a prescription of Suboxone although no refill was given last visit and states a box is missing.  Unsure where it went.  Unsure if the box was the prescription given in February.  He has been taking medication daily.    -Cravings or withdrawals? Yes.    -Last use? 3/5/2023.  Methamphetamine.   -Currently doing inpatient treatment at [facility] 3/6/2023.  [facility] in [place] at the end of February.    -Provides transportation for appointments.  -Does not refill medication.      Chronic pain:  -Hand pain, foot pain, and  "low back pain.  -Interested in increasing gabapentin dose.  States this dose was lowered while he was incarcerated.    -Gabapentin helps with DM neuropathy and back pain.    History of COVID   -Was seen in ER due to chest congestion.  This was in [place].  -Per patient, everything was normal and was told to follow-up with PCP.    Needs refill of fungal cream for feet.    Otzela. Needs refill from dermatologist.    Review of Systems   Constitutional, HEENT, cardiovascular, pulmonary, gi and gu systems are negative, except as otherwise noted.      Objective    /66 (BP Location: Right arm, Patient Position: Sitting, Cuff Size: Adult Large)   Pulse 72   Temp 98  F (36.7  C) (Tympanic)   Resp 14   Ht 1.791 m (5' 10.5\")   Wt 133.9 kg (295 lb 4.8 oz)   SpO2 98%   BMI 41.77 kg/m    Body mass index is 41.77 kg/m .       Physical Exam   GENERAL: healthy, alert and no distress  RESP: lungs clear to auscultation - no rales, rhonchi or wheezes  CV: regular rate and rhythm, normal S1 S2, no S3 or S4, no murmur, click or rub, no peripheral edema  PSYCH: mentation appears normal, affect normal/bright  Foot exam: tinea pedis, dry cracking heels and nail exam onychomycosis of the toenails                "

## 2023-03-14 DIAGNOSIS — E55.9 VITAMIN D DEFICIENCY: ICD-10-CM

## 2023-03-14 RX ORDER — ERGOCALCIFEROL 1.25 MG/1
50000 CAPSULE, LIQUID FILLED ORAL WEEKLY
Qty: 8 CAPSULE | Refills: 0 | OUTPATIENT
Start: 2023-03-14

## 2023-03-14 NOTE — TELEPHONE ENCOUNTER
Needs vitamin D rechecked. Should start 2000 international unit(s) now. Please make a note in next in person shagufta to have vit d lab checked (will be with JK).     BARBARA Hernandez MD  Internal Medicine-Pediatrics

## 2023-03-16 ENCOUNTER — NURSE TRIAGE (OUTPATIENT)
Dept: NURSING | Facility: CLINIC | Age: 43
End: 2023-03-16
Payer: COMMERCIAL

## 2023-03-16 NOTE — TELEPHONE ENCOUNTER
Called [facility], no answer, unable to leave a message.     Priscilla Downey, CHG  [phone]

## 2023-03-17 ENCOUNTER — TELEPHONE (OUTPATIENT)
Dept: PEDIATRICS | Facility: CLINIC | Age: 43
End: 2023-03-17

## 2023-03-17 RX ORDER — BUPRENORPHINE AND NALOXONE 8; 2 MG/1; MG/1
1 FILM, SOLUBLE BUCCAL; SUBLINGUAL DAILY
Status: CANCELLED | OUTPATIENT
Start: 2023-03-17

## 2023-03-17 NOTE — TELEPHONE ENCOUNTER
Received call from patient asking if tongue pain, red strip and white film on tongue are side effects of suboxone.  Advised tongue pain can be a side effect. Patient needed to get off the phone as he was getting grief from other roommates waiting to use it.     WM Luque on 3/17/2023 at 2:48 PM

## 2023-03-17 NOTE — TELEPHONE ENCOUNTER
"Patient called from [facility] concerning prescription for suboxone prescription. Would like to go back to the film form as the sublingual form \"stings\" his tongue and c/o getting red strip on top of tongue.    Does take as directed.    Would also like time change for dosing as current times interfere with group therapy.     Would like to take 8 mg in afternoon instead of morning and take in the 4 mg evening instead of bedtime.    The staff administers the meds, so would need order faxed to [phone].    Call ended abruptly, as may have been disconnected. Attempted to call patient per mobile number and left message if wanted to give further information.    Routed to pcp and prescribing provider.    Starla Schneider RN    "

## 2023-03-19 NOTE — TELEPHONE ENCOUNTER
Thank you for the message.  We have a follow-up on Wednesday this week and we can address the current concern regarding the films versus tablets.    Harrison--Can you reach out to Lev regarding his dose?  He is currently taking 8mg in the morning and 4mg in the afternoon.  Per the Triage RN notes he wants to take 8mg in the afternoon and 4mg in the evening.  This would mean no morning dose.  Let's call to clarify.    Thanks,  Carmela

## 2023-03-20 DIAGNOSIS — E55.9 VITAMIN D DEFICIENCY: ICD-10-CM

## 2023-03-20 NOTE — TELEPHONE ENCOUNTER
Called and discussed with, Vijaya, nurse manager at [facility]. She stated they currently give suboxone at around (8mg) 7 AM and (4mg) 9 PM. Can change dose to noon and nine PM or whatever patient prefers.    Requested a podiatry referral for patient's foot issue. Gave number to schedule with ortho as referral was already placed.     Vijaya also stated that patient has been concerned with some of his medications and does not believe they are accurate from before he went to [facility]. Would like that to be addressed if possible at upcoming virtual appointment.     Informed of vit D labs, will relay to patient.     Discharge: not scheduled.     Priscilla Downey, [facility]  [phone]

## 2023-03-20 NOTE — TELEPHONE ENCOUNTER
Agree w/ acute visit today if he can make it.     Thank you,   BARBARA Hernandez MD  Internal Medicine-Pediatrics    
Attempted to reach patient again, LVMTCB. Per chart review, patient has scheduled appointment 3/17/23. Upon call back, need to see if rescheduling for today preferred/still an option.     Jerome MUÑOZ RN 3/16/2023 at 11:40 AM    
Attempted to reach patient, LVMTCB. At this time, there are acute visits available at  today. Per chart review, patient has scheduled appointment 3/17/23. Upon call back, need to see if rescheduling for today preferred/still an option.     Jerome MUÑOZ RN 3/16/2023 at 9:02 AM    
Per chart review, patient has upcoming appointment 3/22/23. Closing encounter.     Jerome MUÑOZ RN 3/20/2023 at 10:16 AM    
Triage call  Sinus congestion blowing green mucus from his nose.  [facility]  He was asking for a nasal spray he said the over the counter does not help him the nurse line for the facility is [phone].   He also has a sore throat and wanted to be tested for strep throat. Recommended that he goes to  Urgent care if no appointments available and he has to wait till tomorrow to get a ride     Per protocol see in office today or tomorrow.Care advice given.  Verbalizes understanding and agrees with plan.  Transferred to scheduling.  Routing to provider.    Maria Elena Wood RN   [facility] Nurse Advisor  7:10 AM 3/16/2023        Reason for Disposition    Sinus congestion (pressure, fullness) present > 10 days    Additional Information    Negative: Difficulty breathing, and not from stuffy nose (e.g., not relieved by cleaning out the nose)    Negative: Sounds like a life-threatening emergency to the triager    Negative: SEVERE headache and has fever    Negative: Patient sounds very sick or weak to the triager    Negative: SEVERE sinus pain    Negative: Severe headache    Negative: Redness or swelling on the cheek, forehead, or around the eye    Negative: Fever > 103 F (39.4 C)    Negative: Fever > 101 F (38.3 C) and over 60 years of age    Negative: Fever > 100.0 F (37.8 C) and has diabetes mellitus or a weak immune system (e.g., HIV positive, cancer chemotherapy, organ transplant, splenectomy, chronic steroids)    Negative: Fever > 100.0 F (37.8 C) and bedridden (e.g., nursing home patient, stroke, chronic illness, recovering from surgery)    Negative: Fever present > 3 days (72 hours)    Negative: Fever returns after gone for over 24 hours and symptoms worse or not improved    Negative: Sinus pain (not just congestion) and fever    Negative: Earache    Protocols used: SINUS PAIN OR CONGESTION-A-OH      
D'Amico, Randy

## 2023-03-20 NOTE — TELEPHONE ENCOUNTER
Called and discussed with [facility], they will relay to patient.     Priscilla Downey, [facility]  [phone]

## 2023-03-21 ENCOUNTER — OFFICE VISIT (OUTPATIENT)
Dept: PODIATRY | Facility: CLINIC | Age: 43
End: 2023-03-21
Attending: NURSE PRACTITIONER
Payer: COMMERCIAL

## 2023-03-21 DIAGNOSIS — M79.672 PAIN IN BOTH FEET: Primary | ICD-10-CM

## 2023-03-21 DIAGNOSIS — B35.3 TINEA PEDIS OF BOTH FEET: ICD-10-CM

## 2023-03-21 DIAGNOSIS — M79.671 PAIN IN BOTH FEET: Primary | ICD-10-CM

## 2023-03-21 PROCEDURE — 99203 OFFICE O/P NEW LOW 30 MIN: CPT | Performed by: PODIATRIST

## 2023-03-21 NOTE — PATIENT INSTRUCTIONS
PATIENT INSTRUCTIONS - Podiatry / Foot & Ankle Surgery    Dermatology referral today      Here is a list of routine foot care resources, which includes toenail trimming and callus/corn management.     This is not a referral. It is your responsibility to contact the organization and your insurance to confirm cost and coverage.      ROUTINE FOOT CARE (NAIL TRIMMING / CALLUSES)      [facility]  [phone]   [facility]  [phone]  Multiple locations   [facility]  [phone] Dr. Carrillo and Dr. Gage  [address]  [place] [address]  [phone]             [facility]UST[facility] LOCATIONS  [facility]  [address]  [place] [address]  Phone: [phone]  Fax: [phone] [facility]  [address]  [place] [address]  Phone: [phone]   Fax: [phone]   [facility]  [address]  [place] [address]  Phone: [phone]  Fax: [phone] [facility]  [address]  [place] [address]  Phone: [phone]   Fax: [phone]   [facility]   [address]  [place] [address]  Phone: [phone]  Fax: [phone] * Please call any location listed to make an appointment for a casting/fitting. Your referral was sent to their central office and they will all have the order on file.

## 2023-03-21 NOTE — PROGRESS NOTES
Assessment:      ICD-10-CM    1. Pain in both feet  M79.671 Orthotics and Prosthetics DME Other (Comments)    M79.672       2. Tinea pedis of both feet  B35.3 Adult Dermatology Referral     Orthopedic  Referral             Plan:  Orders Placed This Encounter   Procedures     Adult Dermatology Referral     Orthotics and Prosthetics DME Other (Comments)       Discussed the etiology and treatment of the condition with the patient.  Discussed treatment options.      PCP or dermatology for fungus treatment    Dermatology for definitive biopsy & treatment.    Foot care nurse for nail & callus trimming        Orthotic Rx    For foot or ankle pain - return for standing x-rays        Margarita Germain DPM                Chief Complaint:     Patient presents with:  Musculoskeletal Problem: Tinea pedis of both feet     Fungus    HPI:  Lev Downey is a 42 year old year old male who presents for evaluation of fungus.        Past Medical & Surgical History:  Past Medical History:   Diagnosis Date     Asthma     No Comments Provided     Convulsions (H)     No Comments Provided     Essential hypertension     No Comments Provided     Obesity     No Comments Provided     Other depressive disorder     No Comments Provided      Past Surgical History:   Procedure Laterality Date     OTHER SURGICAL HISTORY      1997,.0,([place]) SURGICAL PROCEDURE,left hand- 8-25-97-multi surg      Family History   Problem Relation Age of Onset     Alcoholism Father         Alcoholism     Allergy (Severe) Brother         Allergies     Allergy (Severe) Mother         Allergies     Arthritis Mother         Arthritis     Asthma Brother         Asthma     Prostate Cancer Father         Cancer-prostate     Diabetes Father         Diabetes     Hypertension Mother         Hypertension     Hyperlipidemia Mother         Hyperlipidemia     Other - See Comments Father         Psychiatric illness     Seizure Disorder Brother         Seizures     Other  - See Comments Mother         Stroke        Social History:  ?  History   Smoking Status     Every Day     Packs/day: 0.50     Types: Cigarettes   Smokeless Tobacco     Current     History   Drug use: Unknown     Types: Other     Comment: Drug use: No     Social History    Substance and Sexual Activity      Alcohol use: Yes        Alcohol/week: 0.0 standard drinks        Comment: Alcoholic Drinks/day: Occasional      Allergies:  ?   Allergies   Allergen Reactions     Amitriptyline Swelling     Carbamazepine Angioedema     Prozac [Fluoxetine]         Medications:    Current Outpatient Medications   Medication     acetaminophen (TYLENOL) 500 MG tablet     albuterol (PROAIR HFA/PROVENTIL HFA/VENTOLIN HFA) 108 (90 Base) MCG/ACT inhaler     apremilast (OTEZLA) 30 MG tablet     aspirin - buffered (ASCRIPTIN) 325 MG TABS tablet     aspirin-acetaminophen-caffeine (EXCEDRIN MIGRAINE) 250-250-65 MG tablet     betamethasone dipropionate (DIPROSONE) 0.05 % external cream     bismuth subsalicylate (PEPTO BISMOL) 262 MG/15ML suspension     buprenorphine-naloxone (SUBOXONE) 8-2 MG SUBL sublingual tablet     buPROPion (WELLBUTRIN XL) 150 MG 24 hr tablet     citalopram (CELEXA) 40 MG tablet     cloNIDine (CATAPRES) 0.2 MG tablet     clotrimazole-betamethasone (LOTRISONE) 1-0.05 % external cream     diphenhydrAMINE (BENADRYL) 25 MG tablet     docusate sodium (COLACE) 100 MG capsule     emollient (VANICREAM) external cream     gabapentin (NEURONTIN) 600 MG tablet     Marisol, Zingiber officinalis, (MARISOL ROOT) 550 MG CAPS capsule     glipiZIDE (GLUCOTROL XL) 2.5 MG 24 hr tablet     hydrOXYzine (ATARAX) 50 MG tablet     ibuprofen (ADVIL/MOTRIN) 200 MG tablet     levETIRAcetam (KEPPRA) 1000 MG tablet     melatonin 3 MG tablet     traZODone (DESYREL) 100 MG tablet     vitamin D2 (ERGOCALCIFEROL) 30022 units (1250 mcg) capsule     No current facility-administered medications for this visit.       Physical Exam:  ?  Vitals:  There were no  vitals taken for this visit.   General:  WD/WN, in NAD.  A&O x3.  Dermatologic:    Skin is intact, open lesions absent.  Fungus present  bilateral.  Vascular:  Pulses not palpable bilateral.  Digital capillary refill time normal bilateral.  Generalized edema- 2+ bilateral.  Neurologic:    Gross sensation normal.  Gait and balance normal.  Musculoskeletal:  No pain to palpation of foot & ankle.  aROM intact to foot & ankle.  Muscle strength 5/5 foot & ankle.    RCSP  Valgus R varus L

## 2023-03-21 NOTE — LETTER
3/21/2023         RE: Lev Downey  [address]  [place] [address]        Dear Colleague,    Thank you for referring your patient, Lev Downey, to the [facility]. Please see a copy of my visit note below.    Assessment:      ICD-10-CM    1. Pain in both feet  M79.671 Orthotics and Prosthetics DME Other (Comments)    M79.672       2. Tinea pedis of both feet  B35.3 Adult Dermatology Referral     Orthopedic  Referral             Plan:  Orders Placed This Encounter   Procedures     Adult Dermatology Referral     Orthotics and Prosthetics DME Other (Comments)       Discussed the etiology and treatment of the condition with the patient.  Discussed treatment options.      PCP or dermatology for fungus treatment    Dermatology for definitive biopsy & treatment.    Foot care nurse for nail & callus trimming        Orthotic Rx    For foot or ankle pain - return for standing x-rays        Margarita Germain DPM                Chief Complaint:     Patient presents with:  Musculoskeletal Problem: Tinea pedis of both feet     Fungus    HPI:  Lev Downey is a 42 year old year old male who presents for evaluation of fungus.        Past Medical & Surgical History:  Past Medical History:   Diagnosis Date     Asthma     No Comments Provided     Convulsions (H)     No Comments Provided     Essential hypertension     No Comments Provided     Obesity     No Comments Provided     Other depressive disorder     No Comments Provided      Past Surgical History:   Procedure Laterality Date     OTHER SURGICAL HISTORY      1997,.0,([place]) SURGICAL PROCEDURE,left hand- 2-08-54-multi surg      Family History   Problem Relation Age of Onset     Alcoholism Father         Alcoholism     Allergy (Severe) Brother         Allergies     Allergy (Severe) Mother         Allergies     Arthritis Mother         Arthritis     Asthma Brother         Asthma     Prostate Cancer Father          Cancer-prostate     Diabetes Father         Diabetes     Hypertension Mother         Hypertension     Hyperlipidemia Mother         Hyperlipidemia     Other - See Comments Father         Psychiatric illness     Seizure Disorder Brother         Seizures     Other - See Comments Mother         Stroke        Social History:  ?  History   Smoking Status     Every Day     Packs/day: 0.50     Types: Cigarettes   Smokeless Tobacco     Current     History   Drug use: Unknown     Types: Other     Comment: Drug use: No     Social History    Substance and Sexual Activity      Alcohol use: Yes        Alcohol/week: 0.0 standard drinks        Comment: Alcoholic Drinks/day: Occasional      Allergies:  ?   Allergies   Allergen Reactions     Amitriptyline Swelling     Carbamazepine Angioedema     Prozac [Fluoxetine]         Medications:    Current Outpatient Medications   Medication     acetaminophen (TYLENOL) 500 MG tablet     albuterol (PROAIR HFA/PROVENTIL HFA/VENTOLIN HFA) 108 (90 Base) MCG/ACT inhaler     apremilast (OTEZLA) 30 MG tablet     aspirin - buffered (ASCRIPTIN) 325 MG TABS tablet     aspirin-acetaminophen-caffeine (EXCEDRIN MIGRAINE) 250-250-65 MG tablet     betamethasone dipropionate (DIPROSONE) 0.05 % external cream     bismuth subsalicylate (PEPTO BISMOL) 262 MG/15ML suspension     buprenorphine-naloxone (SUBOXONE) 8-2 MG SUBL sublingual tablet     buPROPion (WELLBUTRIN XL) 150 MG 24 hr tablet     citalopram (CELEXA) 40 MG tablet     cloNIDine (CATAPRES) 0.2 MG tablet     clotrimazole-betamethasone (LOTRISONE) 1-0.05 % external cream     diphenhydrAMINE (BENADRYL) 25 MG tablet     docusate sodium (COLACE) 100 MG capsule     emollient (VANICREAM) external cream     gabapentin (NEURONTIN) 600 MG tablet     Marisol, Zingiber officinalis, (MARISOL ROOT) 550 MG CAPS capsule     glipiZIDE (GLUCOTROL XL) 2.5 MG 24 hr tablet     hydrOXYzine (ATARAX) 50 MG tablet     ibuprofen (ADVIL/MOTRIN) 200 MG tablet     levETIRAcetam  (KEPPRA) 1000 MG tablet     melatonin 3 MG tablet     traZODone (DESYREL) 100 MG tablet     vitamin D2 (ERGOCALCIFEROL) 44987 units (1250 mcg) capsule     No current facility-administered medications for this visit.       Physical Exam:  ?  Vitals:  There were no vitals taken for this visit.   General:  WD/WN, in NAD.  A&O x3.  Dermatologic:    Skin is intact, open lesions absent.  Fungus present  bilateral.  Vascular:  Pulses not palpable bilateral.  Digital capillary refill time normal bilateral.  Generalized edema- 2+ bilateral.  Neurologic:    Gross sensation normal.  Gait and balance normal.  Musculoskeletal:  No pain to palpation of foot & ankle.  aROM intact to foot & ankle.  Muscle strength 5/5 foot & ankle.    RCSP  Valgus R varus L                  Again, thank you for allowing me to participate in the care of your patient.        Sincerely,        Margarita Germain DPM

## 2023-03-21 NOTE — TELEPHONE ENCOUNTER
Routing refill request to provider for review/approval because:  Pt saw new provider at [facility]  See last refill note for high dose vitamin D, pt informed was going to establish care in [facility]  Confirm ongoing refills  Leatha Mao RN, BSN  [facility]

## 2023-03-22 ENCOUNTER — VIRTUAL VISIT (OUTPATIENT)
Dept: PEDIATRICS | Facility: CLINIC | Age: 43
End: 2023-03-22
Payer: COMMERCIAL

## 2023-03-22 ENCOUNTER — DOCUMENTATION ONLY (OUTPATIENT)
Dept: PODIATRY | Facility: CLINIC | Age: 43
End: 2023-03-22

## 2023-03-22 DIAGNOSIS — G89.4 CHRONIC PAIN SYNDROME: ICD-10-CM

## 2023-03-22 DIAGNOSIS — G62.9 NEUROPATHY: ICD-10-CM

## 2023-03-22 DIAGNOSIS — M79.672 PAIN IN BOTH FEET: Primary | ICD-10-CM

## 2023-03-22 DIAGNOSIS — F11.21 OPIOID USE DISORDER, MODERATE, IN EARLY REMISSION (H): Primary | ICD-10-CM

## 2023-03-22 DIAGNOSIS — M79.671 PAIN IN BOTH FEET: Primary | ICD-10-CM

## 2023-03-22 PROCEDURE — 99214 OFFICE O/P EST MOD 30 MIN: CPT | Mod: 95 | Performed by: NURSE PRACTITIONER

## 2023-03-22 RX ORDER — ERGOCALCIFEROL 1.25 MG/1
50000 CAPSULE, LIQUID FILLED ORAL WEEKLY
Qty: 4 CAPSULE | Refills: 0 | Status: SHIPPED | OUTPATIENT
Start: 2023-03-22

## 2023-03-22 RX ORDER — GABAPENTIN 600 MG/1
600 TABLET ORAL 3 TIMES DAILY
Qty: 270 TABLET | Refills: 1 | Status: SHIPPED | OUTPATIENT
Start: 2023-03-22

## 2023-03-22 RX ORDER — BUPRENORPHINE AND NALOXONE 8; 2 MG/1; MG/1
FILM, SOLUBLE BUCCAL; SUBLINGUAL
Qty: 60 FILM | Refills: 0 | Status: SHIPPED | OUTPATIENT
Start: 2023-03-22

## 2023-03-22 NOTE — Clinical Note
Can you fax an updated medication list to [facility].  Specifically the gabapentin and Suboxone dose. OV in 2 weeks.  Please let [facility] know for transportation.  Please fax: [phone]

## 2023-03-22 NOTE — PROGRESS NOTES
Lev is a 42 year old who is being evaluated via a billable telephone visit.      What phone number would you like to be contacted at? [phone]  How would you like to obtain your AVS? Cornelius    Distant Location (provider location):  On-site    Assessment & Plan     Opioid use disorder, moderate, in early remission (H)  Increased Suboxone last visit from 4mg BID to 8mg morning and 4mg at night. Patient still having cravings and high pain.   Increased to 4mg in the morning, 8mg in the afternoon, and 4mg nightly.  Follow-up in 2 weeks with a OV  Message sent to [facility] to coordinate OV and new prescriptions.  - buprenorphine HCl-naloxone HCl (SUBOXONE) 8-2 MG per film; Take 4mg in the morning, 8mg in the afternoon, 4mg at bedtime.  - gabapentin (NEURONTIN) 600 MG tablet; Take 1 tablet (600 mg) by mouth 3 times daily    Chronic pain syndrome  Increased gabapentin to 600mg TID.  Will place new prescription at preferred pharmacy.  - buprenorphine HCl-naloxone HCl (SUBOXONE) 8-2 MG per film; Take 4mg in the morning, 8mg in the afternoon, 4mg at bedtime.  - gabapentin (NEURONTIN) 600 MG tablet; Take 1 tablet (600 mg) by mouth 3 times daily    Neuropathy  - buprenorphine HCl-naloxone HCl (SUBOXONE) 8-2 MG per film; Take 4mg in the morning, 8mg in the afternoon, 4mg at bedtime.  - gabapentin (NEURONTIN) 600 MG tablet; Take 1 tablet (600 mg) by mouth 3 times daily      Beth Villatoro NP  [facility] DEANDRA Blevins   Lev is a 42 year old, presenting for the following health issues:    No chief complaint on file.  No flowsheet data found.    HPI     Suboxone:  -Currently taking 8mg in the afternoon and 4mg at bed.    -Does not like the pills as it burns his mouth.  Interested in switching to films  -Cravings or withdrawals? Yes.    -Last use? 3/5/2023.  Methamphetamine.   -Currently doing inpatient treatment at [facility] 3/6/2023.  [facility] [place]  at the end of February.    -Provides transportation for appointments.  -Does not refill medication.    Chronic pain syndrome:  -Increased gabapentin to 600mg TID.  Still taking 300mg TID.    Did see podiatry.  Given referral for orthotics and dermatology.    Review of Systems   Constitutional, HEENT, cardiovascular, pulmonary, gi and gu systems are negative, except as otherwise noted.      Objective         Vitals:  No vitals were obtained today due to virtual visit.    Physical Exam   healthy, alert and no distress  PSYCH: Alert and oriented times 3; coherent speech, normal   rate and volume, able to articulate logical thoughts, able   to abstract reason, no tangential thoughts, no hallucinations   or delusions  His affect is normal  RESP: No cough, no audible wheezing, able to talk in full sentences  Remainder of exam unable to be completed due to telephone visits            Phone call duration: 15 minutes

## 2023-03-23 ENCOUNTER — TELEPHONE (OUTPATIENT)
Dept: PEDIATRICS | Facility: CLINIC | Age: 43
End: 2023-03-23
Payer: COMMERCIAL

## 2023-03-23 NOTE — TELEPHONE ENCOUNTER
I faxed over AVS(3/22/23), as requested by WM Wilkins, at [facility].   I faxed to [phone], with attention to Raji.     Thanks,  Rosa Ashley MA

## 2023-03-24 ENCOUNTER — TELEPHONE (OUTPATIENT)
Dept: PEDIATRICS | Facility: CLINIC | Age: 43
End: 2023-03-24
Payer: COMMERCIAL

## 2023-03-24 DIAGNOSIS — L08.9 INFECTED WOUND: Primary | ICD-10-CM

## 2023-03-24 DIAGNOSIS — T14.8XXA INFECTED WOUND: Primary | ICD-10-CM

## 2023-03-24 NOTE — LETTER
March 24, 2023      Lev Downey  [address] [place] [address]              To whom it may concern,  RE: Lev Ohara is currently a patient under my care. Please allow him to take his gabapentin and suboxone at the same time. Please call [phone] with questions or concerns.       Sincerely,      Randall Hernandez MD

## 2023-03-24 NOTE — TELEPHONE ENCOUNTER
Talked to Shauna - will hold off until Monday as she just increased his dose.     BARBARA Hernandez MD  Internal Medicine-Pediatrics

## 2023-03-24 NOTE — TELEPHONE ENCOUNTER
Nurse Vijaya, from [facility] called:     Having additional foot and heel pain, patient has noted drainage but no blood. Vijaya believes it is possibly infected. There is also cracked and very hard skin on his heel around sore. Soaking feet with epsom salt. Vijaya believes it possibly needs debridement. Hoping they can get a new referral to have someone come to the home urgently.     Saw ortho from previous referral seemed to only be for shoe fitting and the heel was not addressed.     Believes that there is an infection in patient's gums as well from a piece of tooth that was not removed with a previous extraction. Will take him to urgent care, no appointments available in the clinic today.     Letter need so that patient can take suboxone and gabapentin at the same time. Pended. Requested it to be faxed to: [phone], attn nursing.     Priscilla Downey CHG  [phone]

## 2023-03-26 ENCOUNTER — NURSE TRIAGE (OUTPATIENT)
Dept: NURSING | Facility: CLINIC | Age: 43
End: 2023-03-26
Payer: COMMERCIAL

## 2023-03-26 NOTE — TELEPHONE ENCOUNTER
Pt calling to request a note from PCP to care facility he is currently at stating that he can take his gabapentin and suboxone at the same time TID. Pt states that they are  these meds by a hour and this is effecting his TX. Pt also states that he needs to go up and down stairs to get meds multiple times which effects his pain.    If PCP agreeable to this please fax note to [phone] Attn: [facility].     Jeniffer Ford, RN, BSN  [facility] Nurse Advisor 11:25 AM 3/26/2023    Reason for Disposition    [1] Caller requesting NON-URGENT health information AND [2] PCP's office is the best resource    Additional Information    Negative: [1] Caller is not with the adult (patient) AND [2] reporting urgent symptoms    Negative: Lab result questions    Negative: Medication questions    Negative: Caller can't be reached by phone    Negative: Caller has already spoken to PCP or another triager    Negative: RN needs further essential information from caller in order to complete triage    Negative: Requesting regular office appointment    Protocols used: INFORMATION ONLY CALL - NO TRIAGE-A-

## 2023-03-27 ENCOUNTER — PATIENT OUTREACH (OUTPATIENT)
Dept: CARE COORDINATION | Facility: CLINIC | Age: 43
End: 2023-03-27
Payer: COMMERCIAL

## 2023-03-27 NOTE — TELEPHONE ENCOUNTER
Thanks for the message.  I discussed this with [facility] [place] last week during his visit. We will keep Suboxone and gabapentin at their current times due to increasing them both last week.  These can have sedating side effects which can make him less active/alert during group.    We can re-visit this at his appointment next week.  Can we confirm his appointment?  Can he bring his medication schedule to his OV?    Thanks,  Beth

## 2023-03-27 NOTE — PROGRESS NOTES
Community Paramedic Program    CHW Community/Care Team Outreach    Received referral requesting emergent CP visit to assess wound on pt's foot. Per chart review, pt lives at [facility] in [place]. Per program exclusion criteria, CP would not be able to visit and assist with pt's care.    Routing to pt's PCP and clinic triage for awareness. Closing out CP referral.      Beatriz Donahue  Community Health Worker  Community Paramedic program  [phone]  [email or web address]

## 2023-03-27 NOTE — TELEPHONE ENCOUNTER
Called [facility], no answer. M requesting a call back..     Priscilla Downey, CHG  [phone]

## 2023-03-28 NOTE — TELEPHONE ENCOUNTER
Placed multiple phone calls to [facility] today and could not touch base with RN, Claudette. Will attempt again tomorrow.     Priscilla Downey, G  [phone]

## 2023-03-29 NOTE — TELEPHONE ENCOUNTER
Called and informed Claudette RN at [facility] that no one could come into the treatment facility to provide wound care. Claudette will call podiatry and schedule appointment. Gave referral information.     Priscilla Downey CHG  [phone]

## 2023-03-29 NOTE — TELEPHONE ENCOUNTER
Called and spoke to Claudette at [facility].     Informed her that patient cannot take suboxone and gabapentin at the same time. Claudette was wondering if patient could take Klonidine and suboxone together instead.     Claudette, RN has also noted that patient is falling asleep more often. This started prior to most recent medication increase. Wondering if that is something that can be discussed at upcoming visit.     Priscilla Downey, [facility]  [phone]

## 2023-03-31 ENCOUNTER — VIRTUAL VISIT (OUTPATIENT)
Dept: PEDIATRICS | Facility: CLINIC | Age: 43
End: 2023-03-31
Payer: COMMERCIAL

## 2023-03-31 DIAGNOSIS — F11.21 OPIOID USE DISORDER, MODERATE, IN EARLY REMISSION (H): Primary | ICD-10-CM

## 2023-03-31 DIAGNOSIS — G89.4 CHRONIC PAIN SYNDROME: ICD-10-CM

## 2023-03-31 DIAGNOSIS — G62.9 NEUROPATHY: ICD-10-CM

## 2023-03-31 PROCEDURE — 99213 OFFICE O/P EST LOW 20 MIN: CPT | Mod: 93 | Performed by: NURSE PRACTITIONER

## 2023-03-31 NOTE — PROGRESS NOTES
Lev is a 42 year old who is being evaluated via a billable telephone visit.      What phone number would you like to be contacted at?    [phone] [facility]  How would you like to obtain your AVS? Cornelius    Distant Location (provider location):  Off-site    Assessment & Plan     Opioid use disorder, moderate, in early remission (H)  Neuropathy  Chronic pain syndrome  Will keep medications the same. Discussed him leaving [facility] for [place] on Sunday. No adjustments to medication routine at [facility].  Discussed taking medications as directed and on time so he does not miss medications.  Follow-up in 2 weeks via VV for Suboxone refill        Beth Villatoro NP  [facility] DEANDRA    Felicity Ohara is a 42 year old, presenting for the following health issues:    No chief complaint on file.    HPI     Here for follow-up.      -Interested in grouping together medication intake so he does not miss group  -Gabapentin was increased for 600mg TID last visit and also Suboxone 16mg daily.  Takes 4mg in the morning, 8mg in the afternoon, and 4mg at night.    Interested in increasing his gabapentin to 1200mg TID.  He states this was his dose when incarcerated.    Leaving [facility] next week.  Headed back to [place].  Living with brother.  Not going to do treatment in [place].  Needs to go back to help with mother.      Review of Systems   Constitutional, HEENT, cardiovascular, pulmonary, gi and gu systems are negative, except as otherwise noted.      Objective       Vitals:  No vitals were obtained today due to virtual visit.    Physical Exam   healthy, alert and no distress  PSYCH: Alert and oriented times 3; coherent speech, normal   rate and volume, able to articulate logical thoughts, able   to abstract reason, no tangential thoughts, no hallucinations   or delusions  His affect is normal  RESP: No cough, no audible wheezing, able to talk in full sentences  Remainder of exam  unable to be completed due to telephone visits          Phone call duration: 25 minutes

## 2023-04-03 ENCOUNTER — TELEPHONE (OUTPATIENT)
Dept: PEDIATRICS | Facility: CLINIC | Age: 43
End: 2023-04-03
Payer: COMMERCIAL

## 2023-04-03 NOTE — TELEPHONE ENCOUNTER
Pt called to schedule follow-up apt. Pt scheduled and direct number left if this apt will not work. Ending encounter.     Jeanette Appiah, EMT at 8:19 AM on April 3, 2023  [facility] Guide  [phone]    
Cell Phone/PDA (specify)/Clothing/Wrist Watch

## 2023-04-17 ENCOUNTER — VIRTUAL VISIT (OUTPATIENT)
Dept: PEDIATRICS | Facility: CLINIC | Age: 43
End: 2023-04-17
Payer: COMMERCIAL

## 2023-04-17 DIAGNOSIS — F19.90 SUBSTANCE USE DISORDER: Primary | ICD-10-CM

## 2023-04-17 DIAGNOSIS — R56.9 PARTIAL SEIZURES (H): ICD-10-CM

## 2023-04-17 DIAGNOSIS — I10 PRIMARY HYPERTENSION: ICD-10-CM

## 2023-04-17 DIAGNOSIS — F33.1 MAJOR DEPRESSIVE DISORDER, RECURRENT EPISODE, MODERATE (H): ICD-10-CM

## 2023-04-17 DIAGNOSIS — F41.9 ANXIETY: ICD-10-CM

## 2023-04-17 DIAGNOSIS — L40.9 PSORIASIS: Chronic | ICD-10-CM

## 2023-04-17 DIAGNOSIS — E11.9 TYPE 2 DIABETES MELLITUS WITHOUT COMPLICATION, WITHOUT LONG-TERM CURRENT USE OF INSULIN (H): Chronic | ICD-10-CM

## 2023-04-17 DIAGNOSIS — E66.01 MORBID OBESITY (H): ICD-10-CM

## 2023-04-17 DIAGNOSIS — F39 MOOD DISORDER (H): ICD-10-CM

## 2023-04-17 PROCEDURE — 99213 OFFICE O/P EST LOW 20 MIN: CPT | Mod: VID | Performed by: INTERNAL MEDICINE

## 2023-04-17 NOTE — PROGRESS NOTES
"Lev is a 42 year old who is being evaluated via a billable video visit.      Assessment & Plan       ICD-10-CM    1. Substance use disorder on suboxone  F19.90       2. Partial seizures (H)  R56.9       3. Type 2 diabetes mellitus without complication, without long-term current use of insulin (H)  E11.9       4. Primary hypertension  I10       5. BMI > 40.0 (H)  E66.01       6. Mood disorder (H)  F39       7. Major depressive disorder, recurrent episode, moderate (H)  F33.1       8. Anxiety  F41.9       9. Psoriasis  L40.9         Unfortunately with relapse based on CareEverywhere. Currently living in [place] - I recommended he establish w/ a local provider. I would recommend Neurology, Psychiatry, and Dermatology. Also recommended he establish with someone local for suboxone. I am happy to see him back locally if in the [facility] again.       20 minutes spent by me on the date of the encounter doing chart review, history and exam, documentation and further activities per the note       BMI:   Estimated body mass index is 41.77 kg/m  as calculated from the following:    Height as of 3/8/23: 1.791 m (5' 10.5\").    Weight as of 3/8/23: 133.9 kg (295 lb 4.8 oz).           Randall Hernandez MD  [facility]      Felicity Ohara is a 42 year old, presenting for the following health issues:      No chief complaint on file.         View : No data to display.              HPI   I called [facility] and they noted that he graduated a few weeks ago.    I think called Lev who is now in [place].    # Social   - left [facility] and back home in [facility]  - staying with family   - staying up in [place] for a few months and then maybe back down to the [facility]  - [facility]  --- reviewed [facility]where and was admitted for methamphetamine intoxication  - stopped taking his medications but back on them.  - will connect w/ local doctor  --- he needs to " call them  --- sounds like it was recommended to see Internist.    Agrees that he needs a doctor locally both as an internist and one that can make referrals to specialists.    # Mood disorder  # Major depression  # Anxiety  - previously discussed that I wouldn't recommend wellbutrin given seizure disorder but holding on changes until meets w/ psych.    # Substance use disorder on suboxone    # T2DM  Lab Results   Component Value Date    A1C 6.8 12/08/2022     # Seizure disorder  He reports a history of seizure disorder which he describes as partial seizures in which his legs just are not moving. He has reportedly been on Keppra for quite some time though again has been out of this particular medication. He recalls that perhaps the dose of Keppra was lowered when they started and increased his dose of gabapentin. He has not seen a neurologist anytime recently. We previously talked about how wellbutrin is not typically prescribed to people w/ history of seizure disorder.     Review of Systems         Objective           Vitals:  No vitals were obtained today due to virtual visit.    Physical Exam   Gen: well sounding    Video-Visit Details    Type of service:  Video Visit -> Phone Visit 6 min    Originating Location (pt. Location): Home    Distant Location (provider location):  On-site  Platform used for Video Visit: Seed[employer]

## 2023-04-18 DIAGNOSIS — F41.9 ANXIETY: ICD-10-CM

## 2023-04-18 DIAGNOSIS — E11.9 TYPE 2 DIABETES MELLITUS WITHOUT COMPLICATION, WITHOUT LONG-TERM CURRENT USE OF INSULIN (H): ICD-10-CM

## 2023-04-18 DIAGNOSIS — G47.00 INSOMNIA, UNSPECIFIED TYPE: ICD-10-CM

## 2023-04-20 NOTE — TELEPHONE ENCOUNTER
Called patient to discuss refills, no answer. LVM requesting a call back directly to PAL extension.     Priscilla Downey, [facility]  [phone]

## 2023-04-24 RX ORDER — TRAZODONE HYDROCHLORIDE 100 MG/1
100 TABLET ORAL AT BEDTIME
Qty: 90 TABLET | Refills: 0 | Status: SHIPPED | OUTPATIENT
Start: 2023-04-24

## 2023-04-24 RX ORDER — BUPROPION HYDROCHLORIDE 75 MG/1
75 TABLET ORAL DAILY
Qty: 7 TABLET | Refills: 0 | Status: SHIPPED | OUTPATIENT
Start: 2023-04-24 | End: 2023-05-01

## 2023-04-24 RX ORDER — CITALOPRAM HYDROBROMIDE 40 MG/1
40 TABLET ORAL EVERY MORNING
Qty: 90 TABLET | Refills: 0 | Status: SHIPPED | OUTPATIENT
Start: 2023-04-24

## 2023-04-24 RX ORDER — BUPROPION HYDROCHLORIDE 150 MG/1
150 TABLET ORAL EVERY MORNING
Qty: 90 TABLET | Refills: 0 | OUTPATIENT
Start: 2023-04-24

## 2023-04-24 RX ORDER — GLIPIZIDE 2.5 MG/1
2.5 TABLET, EXTENDED RELEASE ORAL DAILY
Qty: 90 TABLET | Refills: 0 | Status: SHIPPED | OUTPATIENT
Start: 2023-04-24

## 2023-04-24 RX ORDER — CLONIDINE HYDROCHLORIDE 0.2 MG/1
0.2 TABLET ORAL 3 TIMES DAILY
Qty: 270 TABLET | Refills: 0 | Status: SHIPPED | OUTPATIENT
Start: 2023-04-24

## 2023-04-24 NOTE — TELEPHONE ENCOUNTER
Called patient, has not been able to establish care with a new provider. He is scheduled with a provider in [place] but concerned his ride services will not drive that far. Is looking into a provider closer to his current home.     Has not been able to look into referrals, was not given referral information upon leaving [facility]. Gave referral and upcoming appointment information. Patient will look to establish with derm and ortho as well.     Stressed importance of finding a new PCP. Patient will need refills int he meantime.     Priscilla Downey, [facility]  [phone]

## 2023-04-24 NOTE — TELEPHONE ENCOUNTER
I will fill for 90 days but he needs to find a new provider going forward.    With his history of seizure I do not think wellbutrin is a good medication for him.   Stop the 150mg xl  Start 75 mg daily for one week and then stop.    BARBARA Hernandez MD  Internal Medicine-Pediatrics

## 2023-04-24 NOTE — TELEPHONE ENCOUNTER
Can we continue to fill meds?  Clonidine last script said for 90 days in sig - is this to continue?      This is for refills of citalopram, bupropion, clonidine, trazodone and glipizide.

## 2023-04-25 NOTE — TELEPHONE ENCOUNTER
Called patient, informed him of refills and medication change. Encouraged patient multiple times for patient to get new PCP.     Priscilla Downey, [facility]  [phone]

## 2023-05-15 DIAGNOSIS — F41.9 ANXIETY: ICD-10-CM

## 2023-05-15 DIAGNOSIS — G47.00 INSOMNIA, UNSPECIFIED TYPE: ICD-10-CM

## 2023-05-15 DIAGNOSIS — E11.9 TYPE 2 DIABETES MELLITUS WITHOUT COMPLICATION, WITHOUT LONG-TERM CURRENT USE OF INSULIN (H): ICD-10-CM

## 2023-05-15 NOTE — TELEPHONE ENCOUNTER
Patient is requesting Advance Refill Approval for the following medications:  cloNIDine (CATAPRES) 0.2 MG tablet  glipiZIDE (GLUCOTROL XL) 2.5 MG 24 hr tablet  traZODone (DESYREL) 100 MG tablet  citalopram (CELEXA) 40 MG tablet      Note from pharmacy:  There are ZERO refills remaining on presprictions.  Your patient is requesting advanced approval of refills for these medications to prevent any missed doses.

## 2023-05-16 DIAGNOSIS — F11.21 OPIOID USE DISORDER, MODERATE, IN EARLY REMISSION (H): ICD-10-CM

## 2023-05-16 DIAGNOSIS — G62.9 NEUROPATHY: ICD-10-CM

## 2023-05-16 DIAGNOSIS — G89.4 CHRONIC PAIN SYNDROME: ICD-10-CM

## 2023-05-17 RX ORDER — CLONIDINE HYDROCHLORIDE 0.2 MG/1
0.2 TABLET ORAL 3 TIMES DAILY
Qty: 270 TABLET | Refills: 0 | OUTPATIENT
Start: 2023-05-17

## 2023-05-17 RX ORDER — BUPRENORPHINE AND NALOXONE 8; 2 MG/1; MG/1
FILM, SOLUBLE BUCCAL; SUBLINGUAL
Qty: 60 FILM | Refills: 0 | OUTPATIENT
Start: 2023-05-17

## 2023-05-17 RX ORDER — GLIPIZIDE 2.5 MG/1
2.5 TABLET, EXTENDED RELEASE ORAL DAILY
Qty: 90 TABLET | Refills: 0 | OUTPATIENT
Start: 2023-05-17

## 2023-05-17 RX ORDER — TRAZODONE HYDROCHLORIDE 100 MG/1
100 TABLET ORAL AT BEDTIME
Qty: 90 TABLET | Refills: 0 | OUTPATIENT
Start: 2023-05-17

## 2023-05-17 RX ORDER — CITALOPRAM HYDROBROMIDE 40 MG/1
40 TABLET ORAL EVERY MORNING
Qty: 90 TABLET | Refills: 0 | OUTPATIENT
Start: 2023-05-17

## 2023-05-17 NOTE — TELEPHONE ENCOUNTER
Requests for rx's below denied, to early to fill all were sent on 4/24/2023 to requesting pharmacy     Shira Jim Registered Nurse  [facility]

## 2023-05-17 NOTE — TELEPHONE ENCOUNTER
Called patient to schedule appointment, no answer. LVM requesting a call back directly to PAL extension.     Priscilla Downey, [facility]  [phone]

## 2023-05-18 ENCOUNTER — VIRTUAL VISIT (OUTPATIENT)
Dept: PEDIATRICS | Facility: CLINIC | Age: 43
End: 2023-05-18
Payer: COMMERCIAL

## 2023-05-18 DIAGNOSIS — F11.21 OPIOID USE DISORDER, MODERATE, IN EARLY REMISSION (H): Primary | ICD-10-CM

## 2023-05-18 DIAGNOSIS — Z91.199 NO-SHOW FOR APPOINTMENT: ICD-10-CM

## 2023-07-25 DIAGNOSIS — G47.00 INSOMNIA, UNSPECIFIED TYPE: ICD-10-CM

## 2023-07-25 DIAGNOSIS — F41.9 ANXIETY: ICD-10-CM

## 2023-07-25 DIAGNOSIS — E11.9 TYPE 2 DIABETES MELLITUS WITHOUT COMPLICATION, WITHOUT LONG-TERM CURRENT USE OF INSULIN (H): ICD-10-CM

## 2023-07-28 RX ORDER — CLONIDINE HYDROCHLORIDE 0.2 MG/1
0.2 TABLET ORAL 3 TIMES DAILY
Qty: 270 TABLET | Refills: 0 | OUTPATIENT
Start: 2023-07-28

## 2023-07-28 RX ORDER — TRAZODONE HYDROCHLORIDE 100 MG/1
100 TABLET ORAL AT BEDTIME
Qty: 90 TABLET | Refills: 0 | OUTPATIENT
Start: 2023-07-28

## 2023-07-28 RX ORDER — GLIPIZIDE 2.5 MG/1
2.5 TABLET, EXTENDED RELEASE ORAL DAILY
Qty: 90 TABLET | Refills: 0 | OUTPATIENT
Start: 2023-07-28

## 2024-04-15 NOTE — TELEPHONE ENCOUNTER
Routing refill request to provider for review/approval because:  No showed last appointment, confirm ongoing refills  Leatha Mao RN, BSN  [facility]        
Routing refill request to provider for review/approval because:  See last note, pt was to establish care with new PCP in [facility] [place] pharmacy sending refill request  Leatha Mao RN, BSN  [facility]        
15-Apr-2024 18:16